# Patient Record
Sex: FEMALE | Race: WHITE | NOT HISPANIC OR LATINO | Employment: PART TIME | URBAN - METROPOLITAN AREA
[De-identification: names, ages, dates, MRNs, and addresses within clinical notes are randomized per-mention and may not be internally consistent; named-entity substitution may affect disease eponyms.]

---

## 2018-03-27 ENCOUNTER — OFFICE VISIT (OUTPATIENT)
Dept: URGENT CARE | Facility: CLINIC | Age: 23
End: 2018-03-27
Payer: COMMERCIAL

## 2018-03-27 VITALS
DIASTOLIC BLOOD PRESSURE: 73 MMHG | HEIGHT: 61 IN | HEART RATE: 109 BPM | SYSTOLIC BLOOD PRESSURE: 112 MMHG | RESPIRATION RATE: 20 BRPM | WEIGHT: 160 LBS | BODY MASS INDEX: 30.21 KG/M2 | TEMPERATURE: 99 F | OXYGEN SATURATION: 97 %

## 2018-03-27 DIAGNOSIS — J02.0 STREP PHARYNGITIS: Primary | ICD-10-CM

## 2018-03-27 PROCEDURE — 96372 THER/PROPH/DIAG INJ SC/IM: CPT | Mod: S$GLB,,, | Performed by: INTERNAL MEDICINE

## 2018-03-27 PROCEDURE — 99203 OFFICE O/P NEW LOW 30 MIN: CPT | Mod: 25,S$GLB,, | Performed by: NURSE PRACTITIONER

## 2018-03-27 RX ORDER — AMOXICILLIN 875 MG/1
875 TABLET, FILM COATED ORAL 2 TIMES DAILY
Qty: 14 TABLET | Refills: 0 | Status: SHIPPED | OUTPATIENT
Start: 2018-03-27 | End: 2018-04-06

## 2018-03-27 RX ORDER — NORGESTIMATE AND ETHINYL ESTRADIOL 7DAYSX3 28
1 KIT ORAL DAILY
COMMUNITY
End: 2023-05-18

## 2018-03-27 RX ORDER — CEFTRIAXONE 1 G/1
1 INJECTION, POWDER, FOR SOLUTION INTRAMUSCULAR; INTRAVENOUS
Status: COMPLETED | OUTPATIENT
Start: 2018-03-27 | End: 2018-03-27

## 2018-03-27 RX ORDER — PREDNISONE 5 MG/1
5 TABLET ORAL DAILY
Qty: 5 TABLET | Refills: 0 | Status: SHIPPED | OUTPATIENT
Start: 2018-03-27 | End: 2018-04-01

## 2018-03-27 RX ADMIN — CEFTRIAXONE 1 G: 1 INJECTION, POWDER, FOR SOLUTION INTRAMUSCULAR; INTRAVENOUS at 10:03

## 2018-03-27 NOTE — PROGRESS NOTES
"Subjective:       Patient ID: Cyndi Chen is a 23 y.o. female.    Vitals:  height is 5' 1" (1.549 m) and weight is 72.6 kg (160 lb). Her oral temperature is 98.7 °F (37.1 °C). Her blood pressure is 112/73 and her pulse is 109. Her respiration is 20 and oxygen saturation is 97%.     Chief Complaint: Sore Throat    Sore Throat    This is a new problem. The current episode started in the past 7 days (2 Days). The problem has been unchanged. The pain is worse on the right side. There has been no fever. The pain is at a severity of 6/10. The pain is moderate. Associated symptoms include headaches (a little foggy), swollen glands and trouble swallowing. Pertinent negatives include no abdominal pain, congestion, diarrhea, shortness of breath or vomiting. She has tried nothing for the symptoms. The treatment provided no relief.     Review of Systems   Constitution: Positive for malaise/fatigue. Negative for chills, diaphoresis, fever, weakness and night sweats.   HENT: Positive for sore throat and trouble swallowing. Negative for congestion.    Eyes: Negative for blurred vision.   Cardiovascular: Negative for chest pain.   Respiratory: Negative for shortness of breath.    Skin: Negative for rash.   Musculoskeletal: Positive for arthritis and myalgias. Negative for back pain and joint pain.   Gastrointestinal: Negative for abdominal pain, constipation, diarrhea, nausea and vomiting.   Neurological: Positive for headaches (a little foggy).   Psychiatric/Behavioral: The patient is not nervous/anxious.        Objective:      Physical Exam   Constitutional: She is oriented to person, place, and time. She appears well-developed and well-nourished.   HENT:   Head: Normocephalic and atraumatic.   Right Ear: External ear normal.   Left Ear: External ear normal.   Nose: Nose normal.   Right tonsil with +2 swelling and erythema with pus present.    Cardiovascular: Normal rate, regular rhythm and normal heart sounds.  "   Pulmonary/Chest: Effort normal and breath sounds normal.   Abdominal: Soft. Bowel sounds are normal. There is no tenderness.   Musculoskeletal: She exhibits no edema.   Lymphadenopathy:     She has cervical adenopathy.        Right cervical: Superficial cervical and deep cervical adenopathy present. No posterior cervical adenopathy present.       Left cervical: No superficial cervical, no deep cervical and no posterior cervical adenopathy present.   Neurological: She is alert and oriented to person, place, and time.   Skin: Skin is warm and dry.   Psychiatric: She has a normal mood and affect. Her behavior is normal. Judgment and thought content normal.   Nursing note and vitals reviewed.      Assessment:       1. Strep pharyngitis        Plan:         1. Strep pharyngitis  Advised on precautions with kissing and sharing drinks. Request rocephin since having trouble swallowing today. OK for rocephin 1 gm today. Hold oral a/b until tomorrow.   - amoxicillin (AMOXIL) 875 MG tablet; Take 1 tablet (875 mg total) by mouth 2 (two) times daily.  Dispense: 14 tablet; Refill: 0  - predniSONE (DELTASONE) 5 MG tablet; Take 1 tablet (5 mg total) by mouth once daily.  Dispense: 5 tablet; Refill: 0

## 2018-03-27 NOTE — PATIENT INSTRUCTIONS
Hold oral a/b (amoxil) until tomorrow.     Pharyngitis: Strep (Presumed)    You have pharyngitis (sore throat). The cause is thought to be the streptococcus, or strep, bacterium. Strep throat infection can cause throat pain that is worse when swallowing, aching all over, headache, and fever. The infection may be spread by coughing, kissing, or touching others after touching your mouth or nose. Antibiotic medications are given to treat the infection.  Home care  · Rest at home. Drink plenty of fluids to avoid dehydration.  · No work or school for the first 2 days of taking the antibiotics. After this time, you will not be contagious. You can then return to work or school if you are feeling better.   · The antibiotic medication must be taken for the full 10 days, even if you feel better. This is very important to ensure the infection is treated. It is also important to prevent drug-resistant organisms from developing. If you were given an antibiotic shot, no more antibiotics are needed.  · You may use acetaminophen or ibuprofen to control pain or fever, unless another medicine was prescribed for this. If you have chronic liver or kidney disease or ever had a stomach ulcer or GI bleeding, talk with your doctor before using these medicines.  · Throat lozenges or a throat-numbing sprays can help reduce throat pain. Gargling with warm salt water can also help. Dissolve 1/2 teaspoon of salt in 1 8 ounce glass of warm water.   · Avoid salty or spicy foods, which can irritate the throat.  Follow-up care  Follow up with your healthcare provider or our staff if you are not improving over the next week.  When to seek medical advice  Call your healthcare provider right away if any of these occur:  · Fever as directed by your doctor.   · New or worsening ear pain, sinus pain, or headache  · Painful lumps in the back of neck  · Stiff neck  · Lymph nodes are getting larger  · Inability to swallow liquids, excessive drooling, or  inability to open mouth wide due to throat pain  · Signs of dehydration (very dark urine or no urine, sunken eyes, dizziness)  · Trouble breathing or noisy breathing  · Muffled voice  · New rash  Date Last Reviewed: 4/13/2015  © 7613-3951 [employer]. [address] [place] [id number]. All rights reserved. This information is not intended as a substitute for professional medical care. Always follow your healthcare professional's instructions.

## 2018-03-30 ENCOUNTER — TELEPHONE (OUTPATIENT)
Dept: URGENT CARE | Facility: CLINIC | Age: 23
End: 2018-03-30

## 2021-03-23 ENCOUNTER — IMMUNIZATION (OUTPATIENT)
Dept: OBSTETRICS AND GYNECOLOGY | Facility: CLINIC | Age: 26
End: 2021-03-23
Payer: COMMERCIAL

## 2021-03-23 DIAGNOSIS — Z23 NEED FOR VACCINATION: Primary | ICD-10-CM

## 2021-03-23 PROCEDURE — 0001A COVID-19, MRNA, LNP-S, PF, 30 MCG/0.3 ML DOSE VACCINE: ICD-10-PCS | Mod: CV19,,, | Performed by: FAMILY MEDICINE

## 2021-03-23 PROCEDURE — 0001A COVID-19, MRNA, LNP-S, PF, 30 MCG/0.3 ML DOSE VACCINE: CPT | Mod: CV19,,, | Performed by: FAMILY MEDICINE

## 2021-03-23 PROCEDURE — 91300 COVID-19, MRNA, LNP-S, PF, 30 MCG/0.3 ML DOSE VACCINE: CPT | Mod: ,,, | Performed by: FAMILY MEDICINE

## 2021-03-23 PROCEDURE — 91300 COVID-19, MRNA, LNP-S, PF, 30 MCG/0.3 ML DOSE VACCINE: ICD-10-PCS | Mod: ,,, | Performed by: FAMILY MEDICINE

## 2021-04-13 ENCOUNTER — IMMUNIZATION (OUTPATIENT)
Dept: OBSTETRICS AND GYNECOLOGY | Facility: CLINIC | Age: 26
End: 2021-04-13
Payer: COMMERCIAL

## 2021-04-13 DIAGNOSIS — Z23 NEED FOR VACCINATION: Primary | ICD-10-CM

## 2021-04-13 PROCEDURE — 91300 COVID-19, MRNA, LNP-S, PF, 30 MCG/0.3 ML DOSE VACCINE: CPT | Mod: S$GLB,,, | Performed by: FAMILY MEDICINE

## 2021-04-13 PROCEDURE — 0002A COVID-19, MRNA, LNP-S, PF, 30 MCG/0.3 ML DOSE VACCINE: ICD-10-PCS | Mod: CV19,S$GLB,, | Performed by: FAMILY MEDICINE

## 2021-04-13 PROCEDURE — 91300 COVID-19, MRNA, LNP-S, PF, 30 MCG/0.3 ML DOSE VACCINE: ICD-10-PCS | Mod: S$GLB,,, | Performed by: FAMILY MEDICINE

## 2021-04-13 PROCEDURE — 0002A COVID-19, MRNA, LNP-S, PF, 30 MCG/0.3 ML DOSE VACCINE: CPT | Mod: CV19,S$GLB,, | Performed by: FAMILY MEDICINE

## 2022-09-27 ENCOUNTER — OFFICE VISIT (OUTPATIENT)
Dept: URGENT CARE | Facility: CLINIC | Age: 27
End: 2022-09-27
Payer: COMMERCIAL

## 2022-09-27 VITALS
OXYGEN SATURATION: 96 % | WEIGHT: 160 LBS | DIASTOLIC BLOOD PRESSURE: 76 MMHG | RESPIRATION RATE: 16 BRPM | HEIGHT: 61 IN | HEART RATE: 88 BPM | TEMPERATURE: 99 F | BODY MASS INDEX: 30.21 KG/M2 | SYSTOLIC BLOOD PRESSURE: 107 MMHG

## 2022-09-27 DIAGNOSIS — R05.9 COUGH: ICD-10-CM

## 2022-09-27 DIAGNOSIS — J10.1 INFLUENZA A: Primary | ICD-10-CM

## 2022-09-27 DIAGNOSIS — R09.81 NASAL CONGESTION: ICD-10-CM

## 2022-09-27 DIAGNOSIS — R50.9 FEVER, UNSPECIFIED FEVER CAUSE: ICD-10-CM

## 2022-09-27 LAB
CTP QC/QA: YES
CTP QC/QA: YES
POC MOLECULAR INFLUENZA A AGN: POSITIVE
POC MOLECULAR INFLUENZA B AGN: NEGATIVE
SARS-COV-2 RDRP RESP QL NAA+PROBE: NEGATIVE

## 2022-09-27 PROCEDURE — 99204 PR OFFICE/OUTPT VISIT, NEW, LEVL IV, 45-59 MIN: ICD-10-PCS | Mod: S$GLB,,,

## 2022-09-27 PROCEDURE — 3074F SYST BP LT 130 MM HG: CPT | Mod: CPTII,S$GLB,,

## 2022-09-27 PROCEDURE — 3078F PR MOST RECENT DIASTOLIC BLOOD PRESSURE < 80 MM HG: ICD-10-PCS | Mod: CPTII,S$GLB,,

## 2022-09-27 PROCEDURE — 3074F PR MOST RECENT SYSTOLIC BLOOD PRESSURE < 130 MM HG: ICD-10-PCS | Mod: CPTII,S$GLB,,

## 2022-09-27 PROCEDURE — 87502 INFLUENZA DNA AMP PROBE: CPT | Mod: QW,S$GLB,,

## 2022-09-27 PROCEDURE — 1160F RVW MEDS BY RX/DR IN RCRD: CPT | Mod: CPTII,S$GLB,,

## 2022-09-27 PROCEDURE — 3078F DIAST BP <80 MM HG: CPT | Mod: CPTII,S$GLB,,

## 2022-09-27 PROCEDURE — 1159F MED LIST DOCD IN RCRD: CPT | Mod: CPTII,S$GLB,,

## 2022-09-27 PROCEDURE — 87502 POCT INFLUENZA A/B MOLECULAR: ICD-10-PCS | Mod: QW,S$GLB,,

## 2022-09-27 PROCEDURE — U0002: ICD-10-PCS | Mod: QW,S$GLB,,

## 2022-09-27 PROCEDURE — 1159F PR MEDICATION LIST DOCUMENTED IN MEDICAL RECORD: ICD-10-PCS | Mod: CPTII,S$GLB,,

## 2022-09-27 PROCEDURE — 99204 OFFICE O/P NEW MOD 45 MIN: CPT | Mod: S$GLB,,,

## 2022-09-27 PROCEDURE — 3008F BODY MASS INDEX DOCD: CPT | Mod: CPTII,S$GLB,,

## 2022-09-27 PROCEDURE — U0002 COVID-19 LAB TEST NON-CDC: HCPCS | Mod: QW,S$GLB,,

## 2022-09-27 PROCEDURE — 3008F PR BODY MASS INDEX (BMI) DOCUMENTED: ICD-10-PCS | Mod: CPTII,S$GLB,,

## 2022-09-27 PROCEDURE — 1160F PR REVIEW ALL MEDS BY PRESCRIBER/CLIN PHARMACIST DOCUMENTED: ICD-10-PCS | Mod: CPTII,S$GLB,,

## 2022-09-27 RX ORDER — AZELASTINE 1 MG/ML
1 SPRAY, METERED NASAL 2 TIMES DAILY
Qty: 30 ML | Refills: 0 | Status: SHIPPED | OUTPATIENT
Start: 2022-09-27 | End: 2023-05-18

## 2022-09-27 RX ORDER — OSELTAMIVIR PHOSPHATE 75 MG/1
75 CAPSULE ORAL 2 TIMES DAILY
Qty: 10 CAPSULE | Refills: 0 | Status: SHIPPED | OUTPATIENT
Start: 2022-09-27 | End: 2022-10-02

## 2022-09-27 RX ORDER — LEVOCETIRIZINE DIHYDROCHLORIDE 5 MG/1
5 TABLET, FILM COATED ORAL NIGHTLY
COMMUNITY

## 2022-09-27 NOTE — PROGRESS NOTES
"Subjective:       Patient ID: Cyndi Chen is a 27 y.o. female.    Vitals:  height is 5' 1" (1.549 m) and weight is 72.6 kg (160 lb). Her oral temperature is 98.5 °F (36.9 °C). Her blood pressure is 107/76 and her pulse is 88. Her respiration is 16 and oxygen saturation is 96%.     Chief Complaint: Sinus Problem    Patient presents with sinus problems and a dry cough that began yesterday. Patient is triple vaccinated and has not had Covid recently. Patient also states she had a fever of 102 F at 4 AM this morning and took a dose of Advil at 7:00 AM.    Provider note starts below:  Patient senses sinus pain, pressure, postnasal drip, nasal congestion, sore throat and cough and sneezing since last night.  She states she had a fever up to 102 this morning and took Advil.  She has positive exposure to the flu.  Denies any shortness of breath, wheezing, nausea, vomiting, diarrhea.    Sinus Problem  This is a new problem. The current episode started yesterday. The problem has been gradually worsening since onset. The maximum temperature recorded prior to her arrival was 102 - 102.9 F. The fever has been present for 1 to 2 days. Her pain is at a severity of 0/10. She is experiencing no pain. Associated symptoms include chills, congestion, coughing, sinus pressure, sneezing and a sore throat. Pertinent negatives include no ear pain or shortness of breath. Treatments tried: advil. The treatment provided mild relief.     Constitution: Positive for chills and fever.   HENT:  Positive for congestion, postnasal drip, sinus pain, sinus pressure and sore throat. Negative for ear pain.    Respiratory:  Positive for cough. Negative for sputum production, shortness of breath and wheezing.    Gastrointestinal:  Negative for nausea, vomiting and diarrhea.   Musculoskeletal:  Positive for muscle ache.   Allergic/Immunologic: Positive for sneezing.     Objective:      Physical Exam   Constitutional: She is oriented to person, place, " and time. She appears well-developed. She is cooperative.  Non-toxic appearance. She does not appear ill. No distress.   HENT:   Head: Normocephalic and atraumatic.   Ears:   Right Ear: Hearing, tympanic membrane, external ear and ear canal normal.   Left Ear: Hearing, tympanic membrane, external ear and ear canal normal.   Nose: Rhinorrhea present. No mucosal edema or nasal deformity. No epistaxis. Right sinus exhibits no maxillary sinus tenderness and no frontal sinus tenderness. Left sinus exhibits no maxillary sinus tenderness and no frontal sinus tenderness.   Mouth/Throat: Uvula is midline and mucous membranes are normal. Mucous membranes are moist. No trismus in the jaw. Normal dentition. No uvula swelling. Posterior oropharyngeal erythema (mild) present. No oropharyngeal exudate or posterior oropharyngeal edema. Tonsils are 1+ on the right. Tonsils are 1+ on the left. No tonsillar exudate.   Eyes: Conjunctivae and lids are normal. No scleral icterus. Extraocular movement intact   Neck: Trachea normal and phonation normal. Neck supple. No edema present. No erythema present. No neck rigidity present.   Cardiovascular: Normal rate, regular rhythm, normal heart sounds and normal pulses.   Pulmonary/Chest: Effort normal and breath sounds normal. No respiratory distress. She has no decreased breath sounds. She has no wheezes. She has no rhonchi.   Abdominal: Normal appearance.   Musculoskeletal: Normal range of motion.         General: No deformity. Normal range of motion.   Lymphadenopathy:     She has no cervical adenopathy.   Neurological: She is alert and oriented to person, place, and time. She exhibits normal muscle tone. Coordination normal.   Skin: Skin is warm, dry, intact, not diaphoretic and not pale.   Psychiatric: Her speech is normal and behavior is normal. Judgment and thought content normal.   Nursing note and vitals reviewed.      Results for orders placed or performed in visit on 09/27/22   POCT  COVID-19 Rapid Screening   Result Value Ref Range    POC Rapid COVID Negative Negative     Acceptable Yes    POCT Influenza A/B MOLECULAR   Result Value Ref Range    POC Molecular Influenza A Ag Positive (A) Negative, Not Reported    POC Molecular Influenza B Ag Negative Negative, Not Reported     Acceptable Yes        Assessment:       1. Influenza A    2. Cough    3. Fever, unspecified fever cause    4. Nasal congestion          Plan:     Labs reviewed.     Influenza A  -     oseltamivir (TAMIFLU) 75 MG capsule; Take 1 capsule (75 mg total) by mouth 2 (two) times daily. for 5 days  Dispense: 10 capsule; Refill: 0    Cough  -     POCT COVID-19 Rapid Screening    Fever, unspecified fever cause  -     POCT Influenza A/B MOLECULAR    Nasal congestion  -     azelastine (ASTELIN) 137 mcg (0.1 %) nasal spray; 1 spray (137 mcg total) by Nasal route 2 (two) times daily.  Dispense: 30 mL; Refill: 0       Patient Instructions   PLEASE READ ALL DISCHARGE INSTRUCTIONS    - Rest.    - Drink plenty of fluids.  - Viral upper respiratory infections typically run their course in 10-14 days.   - use nasal spray for nasal congestion and to also dry up post nasal drip     - You can take over-the-counter claritin, zyrtec, allegra, or xyzal as directed. These are antihistamines that can help with runny nose, nasal congestion, sneezing, and helps to dry up post-nasal drip, which usually causes sore throat and cough.               - If you do NOT have high blood pressure, you may use a decongestant form (D)  of this medication (ie. Claritin- D, zyrtec-D, allegra-D) or if you do not take the D form, you can take sudafed (pseudoephedrine) over the counter, which is a decongestant. Do NOT take two decongestant (D) medications at the same time (such as mucinex-D and claritin-D or plain sudafed and claritin D). Dextromethorphan (DM) is a cough suppressant over the counter (ie. mucinex DM, robitussin, delsym;  dayquil/nyquil has DM as well.)    -If you DO have high blood pressure, you may take Coricidin HBP for sinus congestion and Delsym for cough.      - You can use Flonase (fluticasone) nasal spray as directed for sinus congestion and postnasal drip. This is a steroid nasal spray that works locally over time to decrease the inflammation in your nose/sinuses and help with allergic symptoms. This is not an quick- relief spray like afrin, but it works well if used daily.  Discontinue if you develop nose bleed  - Use nasal saline prior to Flonase.  - Use Ocean Spray Nasal Saline 1-3 puffs each nostril every 2-3 hours then blow out onto tissue. This is to irrigate the nasal passage way to clear the sinus openings. Use until sinus problem resolved.     - You can take plain Mucinex (guaifenesin) 1200 mg twice a day to help loosen mucous.      - Warm salt water gargles can help with sore throat     - Warm tea with honey can help with cough. Honey is a natural cough suppressant.    - Acetaminophen (tylenol) or Ibuprofen (advil,motrin) as directed as needed for fever/pain. Avoid tylenol if you have a history of liver disease. Do not take ibuprofen if you have a history of GI bleeding, kidney disease, or if you take blood thinners.     - You must understand that you have received an Urgent Care treatment only and that you may be released before all of your medical problems are known or treated.   - You, the patient, will arrange for follow up care as instructed.   - If your condition worsens or fails to improve we recommend that you receive another evaluation at the ER immediately or contact your PCP to discuss your concerns or return here.   - Follow up with your PCP or specialty clinic as directed in the next 1-2 weeks if not improved or as needed.  You can call [phone] to schedule an appointment with the appropriate provider.

## 2022-09-27 NOTE — PATIENT INSTRUCTIONS
PLEASE READ ALL DISCHARGE INSTRUCTIONS    - Rest.    - Drink plenty of fluids.  - Viral upper respiratory infections typically run their course in 10-14 days.   - use nasal spray for nasal congestion and to also dry up post nasal drip     - You can take over-the-counter claritin, zyrtec, allegra, or xyzal as directed. These are antihistamines that can help with runny nose, nasal congestion, sneezing, and helps to dry up post-nasal drip, which usually causes sore throat and cough.               - If you do NOT have high blood pressure, you may use a decongestant form (D)  of this medication (ie. Claritin- D, zyrtec-D, allegra-D) or if you do not take the D form, you can take sudafed (pseudoephedrine) over the counter, which is a decongestant. Do NOT take two decongestant (D) medications at the same time (such as mucinex-D and claritin-D or plain sudafed and claritin D). Dextromethorphan (DM) is a cough suppressant over the counter (ie. mucinex DM, robitussin, delsym; dayquil/nyquil has DM as well.)    -If you DO have high blood pressure, you may take Coricidin HBP for sinus congestion and Delsym for cough.      - You can use Flonase (fluticasone) nasal spray as directed for sinus congestion and postnasal drip. This is a steroid nasal spray that works locally over time to decrease the inflammation in your nose/sinuses and help with allergic symptoms. This is not an quick- relief spray like afrin, but it works well if used daily.  Discontinue if you develop nose bleed  - Use nasal saline prior to Flonase.  - Use Ocean Spray Nasal Saline 1-3 puffs each nostril every 2-3 hours then blow out onto tissue. This is to irrigate the nasal passage way to clear the sinus openings. Use until sinus problem resolved.     - You can take plain Mucinex (guaifenesin) 1200 mg twice a day to help loosen mucous.      - Warm salt water gargles can help with sore throat     - Warm tea with honey can help with cough. Honey is a natural cough  suppressant.    - Acetaminophen (tylenol) or Ibuprofen (advil,motrin) as directed as needed for fever/pain. Avoid tylenol if you have a history of liver disease. Do not take ibuprofen if you have a history of GI bleeding, kidney disease, or if you take blood thinners.     - You must understand that you have received an Urgent Care treatment only and that you may be released before all of your medical problems are known or treated.   - You, the patient, will arrange for follow up care as instructed.   - If your condition worsens or fails to improve we recommend that you receive another evaluation at the ER immediately or contact your PCP to discuss your concerns or return here.   - Follow up with your PCP or specialty clinic as directed in the next 1-2 weeks if not improved or as needed.  You can call [phone] to schedule an appointment with the appropriate provider.

## 2022-09-27 NOTE — LETTER
September 27, 2022      Urgent [facility] [address] [address]  Phone: [phone]  Fax: [phone]       Patient: Cyndi Chen   YOB: 1995  Date of Visit: 09/27/2022    To Whom It May Concern:    Duyen Chen  was at [facility] on 09/27/2022. The patient may return to work/school once afebrile for 24 hours without the use of fever reducing medication.  At the earliest 09/29/2022. If you have any questions or concerns, or if I can be of further assistance, please do not hesitate to contact me.    Sincerely,      Candice Hall PA-C

## 2023-05-18 ENCOUNTER — TELEPHONE (OUTPATIENT)
Dept: PRIMARY CARE CLINIC | Facility: CLINIC | Age: 28
End: 2023-05-18
Payer: COMMERCIAL

## 2023-05-18 ENCOUNTER — OFFICE VISIT (OUTPATIENT)
Dept: PRIMARY CARE CLINIC | Facility: CLINIC | Age: 28
End: 2023-05-18
Payer: COMMERCIAL

## 2023-05-18 VITALS
TEMPERATURE: 98 F | DIASTOLIC BLOOD PRESSURE: 71 MMHG | HEIGHT: 61 IN | BODY MASS INDEX: 36.84 KG/M2 | RESPIRATION RATE: 18 BRPM | WEIGHT: 195.13 LBS | OXYGEN SATURATION: 99 % | HEART RATE: 85 BPM | SYSTOLIC BLOOD PRESSURE: 120 MMHG

## 2023-05-18 DIAGNOSIS — F41.1 GAD (GENERALIZED ANXIETY DISORDER): ICD-10-CM

## 2023-05-18 DIAGNOSIS — K21.9 GASTROESOPHAGEAL REFLUX DISEASE, UNSPECIFIED WHETHER ESOPHAGITIS PRESENT: ICD-10-CM

## 2023-05-18 DIAGNOSIS — Z00.00 PREVENTATIVE HEALTH CARE: ICD-10-CM

## 2023-05-18 DIAGNOSIS — Z91.89 SEDENTARY LIFESTYLE: ICD-10-CM

## 2023-05-18 DIAGNOSIS — F51.02 ADJUSTMENT INSOMNIA: ICD-10-CM

## 2023-05-18 DIAGNOSIS — Z00.00 PREVENTATIVE HEALTH CARE: Primary | ICD-10-CM

## 2023-05-18 DIAGNOSIS — J30.1 SEASONAL ALLERGIC RHINITIS DUE TO POLLEN: Primary | ICD-10-CM

## 2023-05-18 DIAGNOSIS — G47.00 INSOMNIA, UNSPECIFIED TYPE: ICD-10-CM

## 2023-05-18 DIAGNOSIS — A04.8 H. PYLORI INFECTION: ICD-10-CM

## 2023-05-18 DIAGNOSIS — E66.9 OBESITY (BMI 35.0-39.9 WITHOUT COMORBIDITY): ICD-10-CM

## 2023-05-18 PROCEDURE — 3008F PR BODY MASS INDEX (BMI) DOCUMENTED: ICD-10-PCS | Mod: CPTII,S$GLB,, | Performed by: INTERNAL MEDICINE

## 2023-05-18 PROCEDURE — 1159F PR MEDICATION LIST DOCUMENTED IN MEDICAL RECORD: ICD-10-PCS | Mod: CPTII,S$GLB,, | Performed by: INTERNAL MEDICINE

## 2023-05-18 PROCEDURE — 99215 OFFICE O/P EST HI 40 MIN: CPT | Mod: S$GLB,,, | Performed by: INTERNAL MEDICINE

## 2023-05-18 PROCEDURE — 3008F BODY MASS INDEX DOCD: CPT | Mod: CPTII,S$GLB,, | Performed by: INTERNAL MEDICINE

## 2023-05-18 PROCEDURE — 99215 PR OFFICE/OUTPT VISIT, EST, LEVL V, 40-54 MIN: ICD-10-PCS | Mod: S$GLB,,, | Performed by: INTERNAL MEDICINE

## 2023-05-18 PROCEDURE — 3074F PR MOST RECENT SYSTOLIC BLOOD PRESSURE < 130 MM HG: ICD-10-PCS | Mod: CPTII,S$GLB,, | Performed by: INTERNAL MEDICINE

## 2023-05-18 PROCEDURE — 99999 PR PBB SHADOW E&M-EST. PATIENT-LVL IV: ICD-10-PCS | Mod: PBBFAC,,, | Performed by: INTERNAL MEDICINE

## 2023-05-18 PROCEDURE — 1159F MED LIST DOCD IN RCRD: CPT | Mod: CPTII,S$GLB,, | Performed by: INTERNAL MEDICINE

## 2023-05-18 PROCEDURE — 3078F DIAST BP <80 MM HG: CPT | Mod: CPTII,S$GLB,, | Performed by: INTERNAL MEDICINE

## 2023-05-18 PROCEDURE — 3074F SYST BP LT 130 MM HG: CPT | Mod: CPTII,S$GLB,, | Performed by: INTERNAL MEDICINE

## 2023-05-18 PROCEDURE — 3078F PR MOST RECENT DIASTOLIC BLOOD PRESSURE < 80 MM HG: ICD-10-PCS | Mod: CPTII,S$GLB,, | Performed by: INTERNAL MEDICINE

## 2023-05-18 PROCEDURE — 99999 PR PBB SHADOW E&M-EST. PATIENT-LVL IV: CPT | Mod: PBBFAC,,, | Performed by: INTERNAL MEDICINE

## 2023-05-18 RX ORDER — BUSPIRONE HYDROCHLORIDE 10 MG/1
10 TABLET ORAL 3 TIMES DAILY
Qty: 90 TABLET | Refills: 11 | Status: SHIPPED | OUTPATIENT
Start: 2023-05-18 | End: 2024-05-17

## 2023-05-18 RX ORDER — SERTRALINE HYDROCHLORIDE 50 MG/1
TABLET, FILM COATED ORAL
Qty: 30 TABLET | Refills: 11 | Status: SHIPPED | OUTPATIENT
Start: 2023-05-18 | End: 2023-06-20 | Stop reason: SDUPTHER

## 2023-05-18 RX ORDER — FLUTICASONE PROPIONATE 50 MCG
1 SPRAY, SUSPENSION (ML) NASAL 2 TIMES DAILY
Qty: 16 G | Refills: 3 | Status: SHIPPED | OUTPATIENT
Start: 2023-05-18

## 2023-05-18 RX ORDER — OMEPRAZOLE 20 MG/1
20 CAPSULE, DELAYED RELEASE ORAL DAILY
Qty: 30 CAPSULE | Refills: 11 | Status: SHIPPED | OUTPATIENT
Start: 2023-05-18 | End: 2023-05-18

## 2023-05-18 RX ORDER — HYDROXYZINE PAMOATE 25 MG/1
25 CAPSULE ORAL 4 TIMES DAILY
Qty: 30 CAPSULE | Refills: 5 | Status: SHIPPED | OUTPATIENT
Start: 2023-05-18

## 2023-05-18 RX ORDER — OMEPRAZOLE 20 MG/1
20 CAPSULE, DELAYED RELEASE ORAL DAILY
Qty: 30 CAPSULE | Refills: 11 | Status: SHIPPED | OUTPATIENT
Start: 2023-05-18 | End: 2024-05-17

## 2023-05-18 NOTE — ASSESSMENT & PLAN NOTE
COVID booster  Make dental appt   Start Prilosec 20mg daily or Zantac 75mg /Pepcid 20mg over the counter 30 min prior to breakfast x 4-6 weeks.  Initiate GERD precautions ie lose weight, avoid eating 3 hours prior to meals, minimize caffeine, alcohol, tobacco, spicy, greasy, fatty foods; avoid peppermint chocolates, citrus and other acidic foods. Increase exercise to help with digestion and avoid lying down immediately after eating.   If the above are not effective, will obtain CXR and/or refer to ENT for further evaluation.      Schedule with Dr. Andrew for pap smear    Get [facility] ([facility])    Labs Today    Melatonin 5mg at bedtime/ Vistaril 25mg 30 min before bedtime

## 2023-05-18 NOTE — PATIENT INSTRUCTIONS
Start Prilosec 20mg daily or Zantac 75mg /Pepcid 20mg over the counter 30 min prior to breakfast x 4-6 weeks.  Initiate GERD precautions ie lose weight, avoid eating 3 hours prior to meals, minimize caffeine, alcohol, tobacco, spicy, greasy, fatty foods; avoid peppermint chocolates, citrus and other acidic foods. Increase exercise to help with digestion and avoid lying down immediately after eating.   If the above are not effective, will obtain CXR and/or refer to ENT for further evaluation.      Schedule with Dr. Andrew for pap smear    [facility] ([facility])    Labs Today    Melatonin 5mg at bedtime/ Vistaril 25mg 30 min before bedtime    Zoloft 50mg 1/2 tab po daily x 7 days then full tablet daily     Buspar 10mg twice day

## 2023-05-18 NOTE — PROGRESS NOTES
[facility] Progress Note      Chief Complaint     Annual Exam, Establish Care, and Anxiety       Subjective:      History of Present Illness:  Cyndi Chen is a 28 y.o. female last saw Dr. Silva in 2018 as her PCP moved to [facility] in 9/2019     AUNG: used THC to cope with stress associated with work but has weaned off of the THC and no longer smokes THC for a few months; has difficulty focusing at work but is stoic and is able to compensate well so only her close workers know that she is so anxious   -today AUNG-7: 15 today    Insomnia: sleeps 6-8 hrs at night but feels tired since she didn't get enough sleep; has difficult time falling asleep; never tried Melatonin and is on her cell phone frequently  Works 12-13 hours per day x 5 days  -did very well in school     Seasonal AR: needs refill of flonase; still taking Xyzal 5mg daily     GERD: had acute epigastric pain associated with diarrhea on 4/26/2023 comparable to when she had H. Pylori in 2019 and went to urgent care who did H. Pylori IgG and IgM which were negative.   -pt was dx'ed with GERD and placed on prilosec with improvement; has hx H. 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   FH: MGM pancreatic cancer; mom breast cancer dx'ed at 56 y/o; 60 y/o; older sister dx'ed with ADD and takes Vyvanse  20mg and Adderall;        Past Medical History:  Past Medical History:   Diagnosis Date    Allergy        Past Surgical History:  Past Surgical History:   Procedure Laterality Date    MOUTH SURGERY Bilateral        Allergies:  Review of patient's allergies indicates:   Allergen  "Reactions    Zithromax [azithromycin] Rash       Home Medications:  Current Outpatient Medications   Medication Sig Dispense Refill    levocetirizine (XYZAL) 5 MG tablet Take 5 mg by mouth every evening.      fluticasone propionate (FLONASE) 50 mcg/actuation nasal spray 1 spray (50 mcg total) by Each Nostril route 2 (two) times a day. 16 g 3    hydrOXYzine pamoate (VISTARIL) 25 MG Cap Take 1 capsule (25 mg total) by mouth 4 (four) times daily. 30 capsule 5    omeprazole (PRILOSEC) 20 MG capsule Take 1 capsule (20 mg total) by mouth once daily. 30 capsule 11     No current facility-administered medications for this visit.        Family History:  Family History   Problem Relation Age of Onset    Cancer Mother     No Known Problems Father        Social History:  Social History     Tobacco Use    Smoking status: Never    Smokeless tobacco: Never   Substance Use Topics    Alcohol use: Yes    Drug use: No       Review of Systems:  Pertinent positives and negatives listed in HPI. All other systems are reviewed and are negative.    Health Maintaince :   Health Maintenance Topics with due status: Not Due       Topic Last Completion Date    Influenza Vaccine Not Due           Eye: UTD  Dental: need     Immunizations:   Tdap: n/a.  Influenza: n/a but got flu  COVID: needs booster   Hepatitis C:   Cancer Screening:  PAP: UTD 9/2018    The ASCVD Risk score (Simba WALKER, et al., 2019) failed to calculate for the following reasons:    The 2019 ASCVD risk score is only valid for ages 40 to 79      Objective:   /71 (BP Location: Right arm, Patient Position: Sitting, BP Method: Medium (Manual))   Pulse 85   Temp 97.8 °F (36.6 °C)   Resp 18   Ht 5' 1" (1.549 m)   Wt 88.5 kg (195 lb 1.7 oz)   LMP 04/27/2023   SpO2 99%   BMI 36.87 kg/m²      Body mass index is 36.87 kg/m².       Physical Examination:  General: Alert and awake in no apparent distress  HEENT: Normocephalic and atraumatic; Tms WNL  Eyes:  PERRL; EOMi with " anicteric sclera and clear conjunctivae  Mouth:  Oropharynx clear and without exudate; moist mucous membranes  Neck:   Cervical nodes not enlarged; supple; no bruits  Cardio:  Regular rate and rhythm with normal S1 and S2; no murmurs or rubs  Resp:  CTAB; respirations unlabored; no wheezes, crackles or rhonchi  Abdom: Soft, NTND with normoactive bowel sounds; negative HSM  Extrem: Warm and well-perfused with no clubbing, cyanosis or edema  Skin:  No rashes, lesions, or color changes  Pulses:  2+ and symmetric distally  Neuro:  AAOx3; cooperative and pleasant with no focal deficits    Laboratory:      Most Recent Data:  No results found for: WBC, HGB, HCT, PLT, CHOL, TRIG, HDL, LDLDIRECT, ALT, AST, NA, K, CL, BUN, CO2, TSH, PSA, INR, GLUF, HGBA1C, MICROALBUR           CBC: No results found for: WBC, HGB, HCT, PLT, MCV, RDW  BMP: No results found for: NA, K, CL, CO2, BUN, CREATININE, GLU, CALCIUM, MG, PHOS  LFTs: No results found for: PROT, ALBUMIN, BILITOT, AST, ALKPHOS, ALT, GGT  Coags: No results found for: INR, PROTIME, PTT  FLP:    No results found for: CHOL  No results found for: HDL  No results found for: LDLCALC  No results found for: TRIG  No results found for: CHOLHDL   DM:    No results found for: HGBA1C, GLUF, MICROALBUR, LDLCALC, CREATININE  Thyroid: No results found for: TSH, FREET4, K2MPUTZ, Q5FWXBG, THYROIDAB  Anemia: No results found for: IRON, TIBC, FERRITIN, FRITZVIL40, FOLATE  Cardiac: No results found for: TROPONINI, CKTOTAL, CKMB, BNP  Urinalysis: No results found for: LABURIN, COLORU, PHUA, CLARITYU, SPECGRAV, LABSPEC, NITRITE, PROTEINUR, GLUCOSEU, KETONESU, UROBILINOGEN, BILIRUBINUR, BLOODU, RBCU, WBCUA    Other Results:  EKG (my interpretation):     Radiology:  No image results found.          Assessment/Plan     Cyndi Chen is a 28 y.o. female with:  1. Seasonal allergic rhinitis due to pollen  -     fluticasone propionate (FLONASE) 50 mcg/actuation nasal spray; 1 spray (50 mcg total)  by Each Nostril route 2 (two) times a day.  Dispense: 16 g; Refill: 3    2. Preventative health care  Assessment & Plan:  COVID booster  Make dental appt   Start Prilosec 20mg daily or Zantac 75mg /Pepcid 20mg over the counter 30 min prior to breakfast x 4-6 weeks.  Initiate GERD precautions ie lose weight, avoid eating 3 hours prior to meals, minimize caffeine, alcohol, tobacco, spicy, greasy, fatty foods; avoid peppermint chocolates, citrus and other acidic foods. Increase exercise to help with digestion and avoid lying down immediately after eating.   If the above are not effective, will obtain CXR and/or refer to ENT for further evaluation.      Schedule with Dr. Andrew for pap smear    Get Dental ([facility])    Labs Today    Melatonin 5mg at bedtime/ Vistaril 25mg 30 min before bedtime      Orders:  -     Lipid Panel; Future; Expected date: 05/18/2023  -     Hemoglobin A1C; Future; Expected date: 05/18/2023  -     Urinalysis; Future; Expected date: 05/18/2023  -     Comprehensive Metabolic Panel; Future; Expected date: 05/18/2023  -     CBC Auto Differential; Future; Expected date: 05/18/2023  -     Hepatitis C Antibody; Future; Expected date: 05/18/2023  -     HIV 1/2 Ag/Ab (4th Gen); Future; Expected date: 05/18/2023    3. Gastroesophageal reflux disease, unspecified whether esophagitis present  Assessment & Plan:  GERD precautions  Take Prilosec 20mg 30 min prior breakfast  Check stool H. Pylori for cure    Orders:  -     Discontinue: omeprazole (PRILOSEC) 20 MG capsule; Take 1 capsule (20 mg total) by mouth once daily.  Dispense: 30 capsule; Refill: 11  -     omeprazole (PRILOSEC) 20 MG capsule; Take 1 capsule (20 mg total) by mouth once daily.  Dispense: 30 capsule; Refill: 11    4. H. pylori infection  -     H. pylori antigen, stool; Future; Expected date: 05/18/2023    5. Insomnia, unspecified type  Assessment & Plan:  Take melatonin 5mg and take Vistaril 25mg qhs 30 min.prior to bedtime.     Orders:  -      hydrOXYzine pamoate (VISTARIL) 25 MG Cap; Take 1 capsule (25 mg total) by mouth 4 (four) times daily.  Dispense: 30 capsule; Refill: 5    6. Adjustment insomnia  Assessment & Plan:  Take melatonin 5mg and take Vistaril 25mg qhs 30 min.prior to bedtime.                I spent a total of 60 minutes on the day of the visit.This includes face to face time and non-face to face time preparing to see the patient (eg, review of tests), obtaining and/or reviewing separately obtained history, documenting clinical information in the electronic or other health record, independently interpreting results and communicating results to the patient/family/caregiver, or care coordinator.   Code Status:     Yaneth Andrew MD

## 2023-05-19 ENCOUNTER — LAB VISIT (OUTPATIENT)
Dept: LAB | Facility: HOSPITAL | Age: 28
End: 2023-05-19
Attending: INTERNAL MEDICINE
Payer: COMMERCIAL

## 2023-05-19 ENCOUNTER — TELEPHONE (OUTPATIENT)
Dept: PRIMARY CARE CLINIC | Facility: CLINIC | Age: 28
End: 2023-05-19
Payer: COMMERCIAL

## 2023-05-19 DIAGNOSIS — Z00.00 PREVENTATIVE HEALTH CARE: ICD-10-CM

## 2023-05-19 LAB
ALBUMIN SERPL BCP-MCNC: 3.7 G/DL (ref 3.5–5.2)
ALP SERPL-CCNC: 94 U/L (ref 55–135)
ALT SERPL W/O P-5'-P-CCNC: 22 U/L (ref 10–44)
ANION GAP SERPL CALC-SCNC: 6 MMOL/L (ref 8–16)
AST SERPL-CCNC: 24 U/L (ref 10–40)
BASOPHILS # BLD AUTO: 0.1 K/UL (ref 0–0.2)
BASOPHILS NFR BLD: 1.2 % (ref 0–1.9)
BILIRUB SERPL-MCNC: 0.9 MG/DL (ref 0.1–1)
BUN SERPL-MCNC: 11 MG/DL (ref 6–20)
CALCIUM SERPL-MCNC: 9.2 MG/DL (ref 8.7–10.5)
CHLORIDE SERPL-SCNC: 107 MMOL/L (ref 95–110)
CHOLEST SERPL-MCNC: 142 MG/DL (ref 120–199)
CHOLEST/HDLC SERPL: 3 {RATIO} (ref 2–5)
CO2 SERPL-SCNC: 26 MMOL/L (ref 23–29)
CREAT SERPL-MCNC: 0.8 MG/DL (ref 0.5–1.4)
DIFFERENTIAL METHOD: ABNORMAL
EOSINOPHIL # BLD AUTO: 1.8 K/UL (ref 0–0.5)
EOSINOPHIL NFR BLD: 20.8 % (ref 0–8)
ERYTHROCYTE [DISTWIDTH] IN BLOOD BY AUTOMATED COUNT: 12.1 % (ref 11.5–14.5)
EST. GFR  (NO RACE VARIABLE): >60 ML/MIN/1.73 M^2
ESTIMATED AVG GLUCOSE: 91 MG/DL (ref 68–131)
GLUCOSE SERPL-MCNC: 85 MG/DL (ref 70–110)
HBA1C MFR BLD: 4.8 % (ref 4–5.6)
HCT VFR BLD AUTO: 40.8 % (ref 37–48.5)
HCV AB SERPL QL IA: NORMAL
HDLC SERPL-MCNC: 47 MG/DL (ref 40–75)
HDLC SERPL: 33.1 % (ref 20–50)
HGB BLD-MCNC: 13.2 G/DL (ref 12–16)
HIV 1+2 AB+HIV1 P24 AG SERPL QL IA: NORMAL
IMM GRANULOCYTES # BLD AUTO: 0.02 K/UL (ref 0–0.04)
IMM GRANULOCYTES NFR BLD AUTO: 0.2 % (ref 0–0.5)
LDLC SERPL CALC-MCNC: 83.8 MG/DL (ref 63–159)
LYMPHOCYTES # BLD AUTO: 1.9 K/UL (ref 1–4.8)
LYMPHOCYTES NFR BLD: 22.5 % (ref 18–48)
MCH RBC QN AUTO: 30 PG (ref 27–31)
MCHC RBC AUTO-ENTMCNC: 32.4 G/DL (ref 32–36)
MCV RBC AUTO: 93 FL (ref 82–98)
MONOCYTES # BLD AUTO: 0.5 K/UL (ref 0.3–1)
MONOCYTES NFR BLD: 6.4 % (ref 4–15)
NEUTROPHILS # BLD AUTO: 4.2 K/UL (ref 1.8–7.7)
NEUTROPHILS NFR BLD: 48.9 % (ref 38–73)
NONHDLC SERPL-MCNC: 95 MG/DL
NRBC BLD-RTO: 0 /100 WBC
PLATELET # BLD AUTO: 298 K/UL (ref 150–450)
PMV BLD AUTO: 10.8 FL (ref 9.2–12.9)
POTASSIUM SERPL-SCNC: 4.2 MMOL/L (ref 3.5–5.1)
PROT SERPL-MCNC: 7.1 G/DL (ref 6–8.4)
RBC # BLD AUTO: 4.4 M/UL (ref 4–5.4)
SODIUM SERPL-SCNC: 139 MMOL/L (ref 136–145)
TRIGL SERPL-MCNC: 56 MG/DL (ref 30–150)
WBC # BLD AUTO: 8.5 K/UL (ref 3.9–12.7)

## 2023-05-19 PROCEDURE — 86803 HEPATITIS C AB TEST: CPT | Performed by: INTERNAL MEDICINE

## 2023-05-19 PROCEDURE — 80061 LIPID PANEL: CPT | Performed by: INTERNAL MEDICINE

## 2023-05-19 PROCEDURE — 85025 COMPLETE CBC W/AUTO DIFF WBC: CPT | Performed by: INTERNAL MEDICINE

## 2023-05-19 PROCEDURE — 80053 COMPREHEN METABOLIC PANEL: CPT | Performed by: INTERNAL MEDICINE

## 2023-05-19 PROCEDURE — 36415 COLL VENOUS BLD VENIPUNCTURE: CPT | Mod: PN | Performed by: INTERNAL MEDICINE

## 2023-05-19 PROCEDURE — 87389 HIV-1 AG W/HIV-1&-2 AB AG IA: CPT | Performed by: INTERNAL MEDICINE

## 2023-05-19 PROCEDURE — 83036 HEMOGLOBIN GLYCOSYLATED A1C: CPT | Performed by: INTERNAL MEDICINE

## 2023-05-19 NOTE — ASSESSMENT & PLAN NOTE
-Start healthy diet high in fiber (25-35 gm daily), low fat dairy, lean protein, low in saturated/trans fat (minimize cheese, creamy salad dressings); high in calcium/magnesium/potassium; 2.0 gm sodium diet; low in processed sugars and foods, ie  WHITE sugars, bread, pasta, rice, ice cream, cookies, cake, doughnuts  -Drink 6-8 glasses of water daily  -Moderate exercise 30 min 5 times per week or 75 min intense exercise biweekly   -Start 8-10 hour intermittent fasting diet   -Avoid eating 3 hours prior to bedtime  -Reduce alcohol to 1-2 glasses per day  -Avoid smoking, vaping, stimulant drugs/mediations   -Minimize NSAIDs

## 2023-05-20 ENCOUNTER — PATIENT MESSAGE (OUTPATIENT)
Dept: PRIMARY CARE CLINIC | Facility: CLINIC | Age: 28
End: 2023-05-20
Payer: COMMERCIAL

## 2023-06-20 ENCOUNTER — OFFICE VISIT (OUTPATIENT)
Dept: PRIMARY CARE CLINIC | Facility: CLINIC | Age: 28
End: 2023-06-20
Payer: COMMERCIAL

## 2023-06-20 VITALS
WEIGHT: 197.75 LBS | SYSTOLIC BLOOD PRESSURE: 110 MMHG | BODY MASS INDEX: 37.34 KG/M2 | OXYGEN SATURATION: 97 % | HEIGHT: 61 IN | DIASTOLIC BLOOD PRESSURE: 64 MMHG | HEART RATE: 67 BPM

## 2023-06-20 DIAGNOSIS — F41.1 GAD (GENERALIZED ANXIETY DISORDER): ICD-10-CM

## 2023-06-20 DIAGNOSIS — Z12.72 ENCOUNTER FOR PAPANICOLAOU SMEAR OF VAGINA AS PART OF ROUTINE GYNECOLOGICAL EXAMINATION: Primary | ICD-10-CM

## 2023-06-20 DIAGNOSIS — Z01.419 ENCOUNTER FOR PAPANICOLAOU SMEAR OF VAGINA AS PART OF ROUTINE GYNECOLOGICAL EXAMINATION: Primary | ICD-10-CM

## 2023-06-20 DIAGNOSIS — F51.02 ADJUSTMENT INSOMNIA: ICD-10-CM

## 2023-06-20 PROCEDURE — 3008F BODY MASS INDEX DOCD: CPT | Mod: CPTII,S$GLB,, | Performed by: INTERNAL MEDICINE

## 2023-06-20 PROCEDURE — 3078F PR MOST RECENT DIASTOLIC BLOOD PRESSURE < 80 MM HG: ICD-10-PCS | Mod: CPTII,S$GLB,, | Performed by: INTERNAL MEDICINE

## 2023-06-20 PROCEDURE — 3044F HG A1C LEVEL LT 7.0%: CPT | Mod: CPTII,S$GLB,, | Performed by: INTERNAL MEDICINE

## 2023-06-20 PROCEDURE — 3074F SYST BP LT 130 MM HG: CPT | Mod: CPTII,S$GLB,, | Performed by: INTERNAL MEDICINE

## 2023-06-20 PROCEDURE — 3008F PR BODY MASS INDEX (BMI) DOCUMENTED: ICD-10-PCS | Mod: CPTII,S$GLB,, | Performed by: INTERNAL MEDICINE

## 2023-06-20 PROCEDURE — 3044F PR MOST RECENT HEMOGLOBIN A1C LEVEL <7.0%: ICD-10-PCS | Mod: CPTII,S$GLB,, | Performed by: INTERNAL MEDICINE

## 2023-06-20 PROCEDURE — 99999 PR PBB SHADOW E&M-EST. PATIENT-LVL III: ICD-10-PCS | Mod: PBBFAC,,, | Performed by: INTERNAL MEDICINE

## 2023-06-20 PROCEDURE — 99395 PR PREVENTIVE VISIT,EST,18-39: ICD-10-PCS | Mod: S$GLB,,, | Performed by: INTERNAL MEDICINE

## 2023-06-20 PROCEDURE — 3078F DIAST BP <80 MM HG: CPT | Mod: CPTII,S$GLB,, | Performed by: INTERNAL MEDICINE

## 2023-06-20 PROCEDURE — 99999 PR PBB SHADOW E&M-EST. PATIENT-LVL III: CPT | Mod: PBBFAC,,, | Performed by: INTERNAL MEDICINE

## 2023-06-20 PROCEDURE — 3074F PR MOST RECENT SYSTOLIC BLOOD PRESSURE < 130 MM HG: ICD-10-PCS | Mod: CPTII,S$GLB,, | Performed by: INTERNAL MEDICINE

## 2023-06-20 PROCEDURE — 1159F PR MEDICATION LIST DOCUMENTED IN MEDICAL RECORD: ICD-10-PCS | Mod: CPTII,S$GLB,, | Performed by: INTERNAL MEDICINE

## 2023-06-20 PROCEDURE — 88175 CYTOPATH C/V AUTO FLUID REDO: CPT | Performed by: INTERNAL MEDICINE

## 2023-06-20 PROCEDURE — 1159F MED LIST DOCD IN RCRD: CPT | Mod: CPTII,S$GLB,, | Performed by: INTERNAL MEDICINE

## 2023-06-20 PROCEDURE — 99395 PREV VISIT EST AGE 18-39: CPT | Mod: S$GLB,,, | Performed by: INTERNAL MEDICINE

## 2023-06-20 RX ORDER — SERTRALINE HYDROCHLORIDE 50 MG/1
TABLET, FILM COATED ORAL
Qty: 90 TABLET | Refills: 3 | Status: SHIPPED | OUTPATIENT
Start: 2023-06-20

## 2023-06-20 NOTE — PROGRESS NOTES
[facility] Progress Note      Chief Complaint     Follow-up (4-6 week f/u) and Anxiety   for AUNG/insomnia    Subjective:      History of Present Illness:  Cyndi Chen is a 28 y.o. female here for gyn exam and f/u AUNG    Insomnia: doing good sleep hygiene and no longer needs melatonin and vistaril     AUNG: anxiety well-controlled  on Zoloft 50mg po daily with buspar as only needed. Able to focus and get her work done with more motivation. Requests refill of zoloft     GYN: regular cycles monthly; heaviest day with clots and pain x 1-2 days and takes Motrin and Pamprin; bleeds 5-7 days but light x 5 days  Last pap 2018 WNL  FH: Breast cancer: mom 51 y/o when dx'ed but alive  Not sexually active   Currently on cycle       Past Medical History:  Past Medical History:   Diagnosis Date    Allergy        Past Surgical History:  Past Surgical History:   Procedure Laterality Date    MOUTH SURGERY Bilateral        Allergies:  Review of patient's allergies indicates:   Allergen Reactions    Zithromax [azithromycin] Rash       Home Medications:  Current Outpatient Medications   Medication Sig Dispense Refill    busPIRone (BUSPAR) 10 MG tablet Take 1 tablet (10 mg total) by mouth 3 (three) times daily. 90 tablet 11    fluticasone propionate (FLONASE) 50 mcg/actuation nasal spray 1 spray (50 mcg total) by Each Nostril route 2 (two) times a day. 16 g 3    hydrOXYzine pamoate (VISTARIL) 25 MG Cap Take 1 capsule (25 mg total) by mouth 4 (four) times daily. 30 capsule 5    levocetirizine (XYZAL) 5 MG tablet Take 5 mg by mouth every evening.      omeprazole (PRILOSEC) 20 MG capsule Take 1 capsule (20 mg total) by mouth once daily. 30 capsule 11    sertraline (ZOLOFT) 50 MG tablet Take 1 tab po daily 90 tablet 3     No current facility-administered medications for this visit.        Family History:  Family History   Problem Relation Age of Onset    Cancer Mother     No Known Problems Father        Social  "History:  Social History     Tobacco Use    Smoking status: Never    Smokeless tobacco: Never   Substance Use Topics    Alcohol use: Yes    Drug use: No       Review of Systems:  Pertinent positives and negatives listed in HPI. All other systems are reviewed and are negative.    Health Maintaince :   Health Maintenance Topics with due status: Not Due       Topic Last Completion Date    Influenza Vaccine Not Due           Eye:   Dental:     Immunizations:     The ASCVD Risk score (Simba WALKER, et al., 2019) failed to calculate for the following reasons:    The 2019 ASCVD risk score is only valid for ages 40 to 79      Objective:   /64 (BP Location: Left arm, Patient Position: Sitting, BP Method: Large (Manual))   Pulse 67   Ht 5' 1" (1.549 m)   Wt 89.7 kg (197 lb 12 oz)   LMP 06/15/2023 (Approximate)   SpO2 97%   BMI 37.37 kg/m²      Body mass index is 37.37 kg/m².       Physical Examination:  General: Alert and awake in no apparent distress  HEENT: Normocephalic and atraumatic; Tms WNL  Eyes:  PERRL; EOMi with anicteric sclera and clear conjunctivae  Mouth:  Oropharynx clear and without exudate; moist mucous membranes  Breasts:          pendulous without axillary nodes; no skin changes; no masses; no nipple discharge  Neck:   Cervical nodes not enlarged; supple; no bruits  Cardio:  Regular rate and rhythm with normal S1 and S2; no murmurs or rubs  Resp:  CTAB; respirations unlabored; no wheezes, crackles or rhonchi  Abdom: Soft, NTND with normoactive bowel sounds; negative HSM  Extrem: Warm and well-perfused with no clubbing, cyanosis or edema  GYN               normal rugae; mild bleeding from cervix without lesions; no CMT/adnexal fullness or tenderness  Skin:  No rashes, lesions, or color changes  Pulses:  2+ and symmetric distally  Neuro:  AAOx3; cooperative and pleasant with no focal deficits    Laboratory:      Most Recent Data:  Lab Results   Component Value Date    WBC 8.50 05/19/2023    HGB 13.2 " 05/19/2023    HCT 40.8 05/19/2023     05/19/2023    CHOL 142 05/19/2023    TRIG 56 05/19/2023    HDL 47 05/19/2023    ALT 22 05/19/2023    AST 24 05/19/2023     05/19/2023    K 4.2 05/19/2023     05/19/2023    BUN 11 05/19/2023    CO2 26 05/19/2023    HGBA1C 4.8 05/19/2023              CBC:   WBC   Date Value Ref Range Status   05/19/2023 8.50 3.90 - 12.70 K/uL Final     Hemoglobin   Date Value Ref Range Status   05/19/2023 13.2 12.0 - 16.0 g/dL Final     Hematocrit   Date Value Ref Range Status   05/19/2023 40.8 37.0 - 48.5 % Final     Platelets   Date Value Ref Range Status   05/19/2023 298 150 - 450 K/uL Final     MCV   Date Value Ref Range Status   05/19/2023 93 82 - 98 fL Final     RDW   Date Value Ref Range Status   05/19/2023 12.1 11.5 - 14.5 % Final     BMP:   Sodium   Date Value Ref Range Status   05/19/2023 139 136 - 145 mmol/L Final     Potassium   Date Value Ref Range Status   05/19/2023 4.2 3.5 - 5.1 mmol/L Final     Chloride   Date Value Ref Range Status   05/19/2023 107 95 - 110 mmol/L Final     CO2   Date Value Ref Range Status   05/19/2023 26 23 - 29 mmol/L Final     BUN   Date Value Ref Range Status   05/19/2023 11 6 - 20 mg/dL Final     Creatinine   Date Value Ref Range Status   05/19/2023 0.8 0.5 - 1.4 mg/dL Final     Glucose   Date Value Ref Range Status   05/19/2023 85 70 - 110 mg/dL Final     Calcium   Date Value Ref Range Status   05/19/2023 9.2 8.7 - 10.5 mg/dL Final     LFTs:   Total Protein   Date Value Ref Range Status   05/19/2023 7.1 6.0 - 8.4 g/dL Final     Albumin   Date Value Ref Range Status   05/19/2023 3.7 3.5 - 5.2 g/dL Final     Total Bilirubin   Date Value Ref Range Status   05/19/2023 0.9 0.1 - 1.0 mg/dL Final     Comment:     For infants and newborns, interpretation of results should be based  on gestational age, weight and in agreement with clinical  observations.    Premature Infant recommended reference ranges:  Up to 24 hours.............<8.0 mg/dL  Up  to 48 hours............<12.0 mg/dL  3-5 days..................<15.0 mg/dL  6-29 days.................<15.0 mg/dL       AST   Date Value Ref Range Status   05/19/2023 24 10 - 40 U/L Final     Alkaline Phosphatase   Date Value Ref Range Status   05/19/2023 94 55 - 135 U/L Final     ALT   Date Value Ref Range Status   05/19/2023 22 10 - 44 U/L Final     Coags: No results found for: INR, PROTIME, PTT  FLP:      Lab Results   Component Value Date    CHOL 142 05/19/2023     Lab Results   Component Value Date    HDL 47 05/19/2023     Lab Results   Component Value Date    LDLCALC 83.8 05/19/2023     Lab Results   Component Value Date    TRIG 56 05/19/2023     Lab Results   Component Value Date    CHOLHDL 33.1 05/19/2023      DM:      Hemoglobin A1C   Date Value Ref Range Status   05/19/2023 4.8 4.0 - 5.6 % Final     Comment:     ADA Screening Guidelines:  5.7-6.4%  Consistent with prediabetes  >or=6.5%  Consistent with diabetes    High levels of fetal hemoglobin interfere with the HbA1C  assay. Heterozygous hemoglobin variants (HbS, HgC, etc)do  not significantly interfere with this assay.   However, presence of multiple variants may affect accuracy.       LDL Cholesterol   Date Value Ref Range Status   05/19/2023 83.8 63.0 - 159.0 mg/dL Final     Comment:     The National Cholesterol Education Program (NCEP) has set the  following guidelines (reference values) for LDL Cholesterol:  Optimal.......................<130 mg/dL  Borderline High...............130-159 mg/dL  High..........................160-189 mg/dL  Very High.....................>190 mg/dL       Creatinine   Date Value Ref Range Status   05/19/2023 0.8 0.5 - 1.4 mg/dL Final     Thyroid: No results found for: TSH, FREET4, J2QOXOI, R5TBPAV, THYROIDAB  Anemia: No results found for: IRON, TIBC, FERRITIN, [id number], FOLATE  Cardiac: No results found for: TROPONINI, CKTOTAL, CKMB, BNP  Urinalysis:   Color, UA   Date Value Ref Range Status   05/19/2023 Yellow Yellow,  Straw, Sravanthi Final     Specific Gravity, UA   Date Value Ref Range Status   05/19/2023 1.025 1.005 - 1.030 Final     Nitrite, UA   Date Value Ref Range Status   05/19/2023 Negative Negative Final     Ketones, UA   Date Value Ref Range Status   05/19/2023 Trace (A) Negative Final     WBC, UA   Date Value Ref Range Status   05/19/2023 17 (H) 0 - 5 /hpf Final       Other Results:  EKG (my interpretation):     Radiology:  No image results found.          Assessment/Plan     Cyndi Chen is a 28 y.o. female with:  1. Encounter for Papanicolaou smear of vagina as part of routine gynecological examination  Assessment & Plan:  Will call if abnormal results    Orders:  -     Liquid-Based Pap Smear, Screening    2. AUNG (generalized anxiety disorder)  Overview:  AUNG 7:  15    Assessment & Plan:  Refill Zoloft 50mg po daily  Take Buspar prn     Orders:  -     sertraline (ZOLOFT) 50 MG tablet; Take 1 tab po daily  Dispense: 90 tablet; Refill: 3    3. Adjustment insomnia  Assessment & Plan:  Stable without melatonin or vistaril               I spent a total of 15 minutes on the day of the visit.This includes face to face time and non-face to face time preparing to see the patient (eg, review of tests), obtaining and/or reviewing separately obtained history, documenting clinical information in the electronic or other health record, independently interpreting results and communicating results to the patient/family/caregiver, or care coordinator.   Code Status:     Yaneth Andrew MD

## 2023-06-22 LAB
FINAL PATHOLOGIC DIAGNOSIS: NORMAL
Lab: NORMAL

## 2024-03-26 ENCOUNTER — OFFICE VISIT (OUTPATIENT)
Dept: PRIMARY CARE CLINIC | Facility: CLINIC | Age: 29
End: 2024-03-26
Payer: COMMERCIAL

## 2024-03-26 DIAGNOSIS — Z00.00 PREVENTATIVE HEALTH CARE: ICD-10-CM

## 2024-03-26 DIAGNOSIS — N89.8 VAGINAL DISCHARGE: ICD-10-CM

## 2024-03-26 DIAGNOSIS — Z30.09 ENCOUNTER FOR OTHER GENERAL COUNSELING OR ADVICE ON CONTRACEPTION: Primary | ICD-10-CM

## 2024-03-26 PROBLEM — N76.1 CHRONIC VAGINITIS: Status: RESOLVED | Noted: 2024-03-26 | Resolved: 2024-03-26

## 2024-03-26 PROBLEM — Z30.8 ENCOUNTER FOR OTHER CONTRACEPTIVE MANAGEMENT: Status: RESOLVED | Noted: 2023-05-18 | Resolved: 2024-03-26

## 2024-03-26 PROBLEM — Z12.72 ENCOUNTER FOR PAPANICOLAOU SMEAR OF VAGINA AS PART OF ROUTINE GYNECOLOGICAL EXAMINATION: Status: RESOLVED | Noted: 2023-05-18 | Resolved: 2024-03-26

## 2024-03-26 PROBLEM — Z01.419 ENCOUNTER FOR PAPANICOLAOU SMEAR OF VAGINA AS PART OF ROUTINE GYNECOLOGICAL EXAMINATION: Status: RESOLVED | Noted: 2023-05-18 | Resolved: 2024-03-26

## 2024-03-26 PROBLEM — Z30.8 ENCOUNTER FOR OTHER CONTRACEPTIVE MANAGEMENT: Status: ACTIVE | Noted: 2023-05-18

## 2024-03-26 PROBLEM — N76.1 CHRONIC VAGINITIS: Status: ACTIVE | Noted: 2024-03-26

## 2024-03-26 PROCEDURE — 99213 OFFICE O/P EST LOW 20 MIN: CPT | Mod: 95,,, | Performed by: INTERNAL MEDICINE

## 2024-03-26 NOTE — PROGRESS NOTES
[facility] Progress Note      Audiovisual Visit  Location:  [place], [place]      Chief Complaint     No chief complaint on file.   for vaginal discharge    Subjective:      History of Present Illness:  Cyndi Chen is a 29 y.o. female     C/o clear profuse non-foul-smelling vaginal discharge during mid-cycle associated with protected sex with condom use; pt also gets her typical LBP for which she takes Tylenol; has new partner x 2 mo    Contraception: stopped OCPs in the past due to significant weight gain but would like to pursue other options; OCPs definitely regulated her cycles and resulted in minimal bleeding/cramps     Past Medical History:  Past Medical History:   Diagnosis Date    Allergy     Encounter for Papanicolaou smear of vagina as part of routine gynecological examination 05/18/2023       Past Surgical History:  Past Surgical History:   Procedure Laterality Date    MOUTH SURGERY Bilateral        Allergies:  Review of patient's allergies indicates:   Allergen Reactions    Zithromax [azithromycin] Rash       Home Medications:  Current Outpatient Medications   Medication Sig Dispense Refill    busPIRone (BUSPAR) 10 MG tablet Take 1 tablet (10 mg total) by mouth 3 (three) times daily. 90 tablet 11    fluticasone propionate (FLONASE) 50 mcg/actuation nasal spray 1 spray (50 mcg total) by Each Nostril route 2 (two) times a day. 16 g 3    hydrOXYzine pamoate (VISTARIL) 25 MG Cap Take 1 capsule (25 mg total) by mouth 4 (four) times daily. 30 capsule 5    levocetirizine (XYZAL) 5 MG tablet Take 5 mg by mouth every evening.      omeprazole (PRILOSEC) 20 MG capsule Take 1 capsule (20 mg total) by mouth once daily. 30 capsule 11    sertraline (ZOLOFT) 50 MG tablet Take 1 tab po daily 90 tablet 3     No current facility-administered medications for this visit.        Family History:  Family History   Problem Relation Age of Onset    Cancer Mother     No Known Problems Father         Social History:  Social History     Tobacco Use    Smoking status: Never    Smokeless tobacco: Never   Substance Use Topics    Alcohol use: Yes    Drug use: No       Review of Systems:  Pertinent positives and negatives listed in HPI. All other systems are reviewed and are negative.    Health Maintaince :   Health Maintenance Topics with due status: Not Due       Topic Last Completion Date    TETANUS VACCINE 10/04/2019           Eye:   Dental:     Immunizations:   The ASCVD Risk score (Simba WALKER, et al., 2019) failed to calculate for the following reasons:    The 2019 ASCVD risk score is only valid for ages 40 to 79      Objective:   There were no vitals taken for this visit.     There is no height or weight on file to calculate BMI.       Physical Examination:  General: Alert and awake in no apparent distress  Neuro:  AAOx3; cooperative and pleasant with no focal deficits    Laboratory:      Most Recent Data:  Lab Results   Component Value Date    WBC 8.50 05/19/2023    HGB 13.2 05/19/2023    HCT 40.8 05/19/2023     05/19/2023    CHOL 142 05/19/2023    TRIG 56 05/19/2023    HDL 47 05/19/2023    ALT 22 05/19/2023    AST 24 05/19/2023     05/19/2023    K 4.2 05/19/2023     05/19/2023    BUN 11 05/19/2023    CO2 26 05/19/2023    HGBA1C 4.8 05/19/2023              CBC:   WBC   Date Value Ref Range Status   05/19/2023 8.50 3.90 - 12.70 K/uL Final     Hemoglobin   Date Value Ref Range Status   05/19/2023 13.2 12.0 - 16.0 g/dL Final     Hematocrit   Date Value Ref Range Status   05/19/2023 40.8 37.0 - 48.5 % Final     Platelets   Date Value Ref Range Status   05/19/2023 298 150 - 450 K/uL Final     MCV   Date Value Ref Range Status   05/19/2023 93 82 - 98 fL Final     RDW   Date Value Ref Range Status   05/19/2023 12.1 11.5 - 14.5 % Final     BMP:   Sodium   Date Value Ref Range Status   05/19/2023 139 136 - 145 mmol/L Final     Potassium   Date Value Ref Range Status   05/19/2023 4.2 3.5 - 5.1  "mmol/L Final     Chloride   Date Value Ref Range Status   05/19/2023 107 95 - 110 mmol/L Final     CO2   Date Value Ref Range Status   05/19/2023 26 23 - 29 mmol/L Final     BUN   Date Value Ref Range Status   05/19/2023 11 6 - 20 mg/dL Final     Creatinine   Date Value Ref Range Status   05/19/2023 0.8 0.5 - 1.4 mg/dL Final     Glucose   Date Value Ref Range Status   05/19/2023 85 70 - 110 mg/dL Final     Calcium   Date Value Ref Range Status   05/19/2023 9.2 8.7 - 10.5 mg/dL Final     LFTs:   Total Protein   Date Value Ref Range Status   05/19/2023 7.1 6.0 - 8.4 g/dL Final     Albumin   Date Value Ref Range Status   05/19/2023 3.7 3.5 - 5.2 g/dL Final     Total Bilirubin   Date Value Ref Range Status   05/19/2023 0.9 0.1 - 1.0 mg/dL Final     Comment:     For infants and newborns, interpretation of results should be based  on gestational age, weight and in agreement with clinical  observations.    Premature Infant recommended reference ranges:  Up to 24 hours.............<8.0 mg/dL  Up to 48 hours............<12.0 mg/dL  3-5 days..................<15.0 mg/dL  6-29 days.................<15.0 mg/dL       AST   Date Value Ref Range Status   05/19/2023 24 10 - 40 U/L Final     Alkaline Phosphatase   Date Value Ref Range Status   05/19/2023 94 55 - 135 U/L Final     ALT   Date Value Ref Range Status   05/19/2023 22 10 - 44 U/L Final     Coags: No results found for: "INR", "PROTIME", "PTT"  FLP:      Lab Results   Component Value Date    CHOL 142 05/19/2023     Lab Results   Component Value Date    HDL 47 05/19/2023     Lab Results   Component Value Date    LDLCALC 83.8 05/19/2023     Lab Results   Component Value Date    TRIG 56 05/19/2023     Lab Results   Component Value Date    CHOLHDL 33.1 05/19/2023      DM:      Hemoglobin A1C   Date Value Ref Range Status   05/19/2023 4.8 4.0 - 5.6 % Final     Comment:     ADA Screening Guidelines:  5.7-6.4%  Consistent with prediabetes  >or=6.5%  Consistent with " "diabetes    High levels of fetal hemoglobin interfere with the HbA1C  assay. Heterozygous hemoglobin variants (HbS, HgC, etc)do  not significantly interfere with this assay.   However, presence of multiple variants may affect accuracy.       LDL Cholesterol   Date Value Ref Range Status   05/19/2023 83.8 63.0 - 159.0 mg/dL Final     Comment:     The National Cholesterol Education Program (NCEP) has set the  following guidelines (reference values) for LDL Cholesterol:  Optimal.......................<130 mg/dL  Borderline High...............130-159 mg/dL  High..........................160-189 mg/dL  Very High.....................>190 mg/dL       Creatinine   Date Value Ref Range Status   05/19/2023 0.8 0.5 - 1.4 mg/dL Final     Thyroid: No results found for: "TSH", "FREET4", "V2TBHJO", "T5AJODT", "THYROIDAB"  Anemia: No results found for: "IRON", "TIBC", "FERRITIN", "RQZKKSPH02", "FOLATE"  Cardiac: No results found for: "TROPONINI", "CKTOTAL", "CKMB", "BNP"  Urinalysis:   Color, UA   Date Value Ref Range Status   05/19/2023 Yellow Yellow, Straw, Sravanthi Final     Specific Gravity, UA   Date Value Ref Range Status   05/19/2023 1.025 1.005 - 1.030 Final     Nitrite, UA   Date Value Ref Range Status   05/19/2023 Negative Negative Final     Ketones, UA   Date Value Ref Range Status   05/19/2023 Trace (A) Negative Final     WBC, UA   Date Value Ref Range Status   05/19/2023 17 (H) 0 - 5 /hpf Final       Other Results:  EKG (my interpretation):     Radiology:  No image results found.          Assessment/Plan     Cyndi Chen is a 29 y.o. female with:  1. Encounter for other general counseling or advice on contraception  Assessment & Plan:  Refer to GYN for possible IUD placement     Orders:  -     Ambulatory referral/consult to Gynecology; Future; Expected date: 03/26/2024    2. Preventative health care  Assessment & Plan:  Refer to GYN for possible IUD placement       3. Vaginal discharge  Assessment & Plan:  Likely " benign etiology but cont to wear condoms consistently  -pay attention to change in color, odor, frequency               I spent 23 min with this pt that includes face to face time and non-face to face time preparing to see the patient (eg, review of tests), obtaining and/or reviewing separately obtained history, documenting clinical information in the electronic or other health record, independently interpreting results and communicating results to the patient/family/caregiver, or care coordinator.   Code Status:     Yaneth Andrew MD

## 2024-03-26 NOTE — ASSESSMENT & PLAN NOTE
Likely benign etiology but cont to wear condoms consistently  -pay attention to change in color, odor, frequency

## 2024-04-28 NOTE — PROGRESS NOTES
Chief Complaint: BC Consult      HPI:      Cyndi is a 29 y.o. No obstetric history on file. who presents today for birth control consult.     She was previously on OCPs but stopped in 2019 due to weight gain, negative impact on her mood and was difficult for her to remember to take pills every day.   She was initially started on OCPs because her periods were heavy and painful.   Since stopping her OCPs, periods have become progressively more intense.   LMP: 3/26. On average - periods every 4 weeks, last 5 days with moderate flow, will notice a lot of clots with bleeding.   She will have pelvic and back pain the 2 weeks prior to her period. She also feels lethargic and weak 2 weeks prior to period. 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   She has also noticed hot flashses during her period and randomly throughout cycle.     She will take Ibuprofen for her pain which provides relief.     She denies pain w/ sex.   She had 1 episode of noticing a lot of vaginal discharge during ovulation. Resolved. No other vaginal concerns.     Cyndi  is currently sexually active . She is currently using condoms for contraception.     Previous Pap: NILM (6/22/2023)    Gardasil:Completed       OB History    No obstetric history on file.       ROS:     GENERAL: Feeling well overall.   CARDIOVASCULAR: Denies palpitations or chest pain.   RESPIRATORY: Denies shortness of breath.  BREASTS: see HPI.  ABDOMEN: Denies constipation, diarrhea, blood in stool.  URINARY: see HPI.  REPRODUCTIVE: see HPI.  PSYCHIATRIC: Denies uncontrolled depression or anxiety.    Physical Exam:      Physical Exam     /75   Wt 88.8 kg (195 lb 12.3 oz)   LMP 03/26/2024   BMI 36.99 kg/m²   Body mass index is 36.99 kg/m².     APPEARANCE: Well nourished, well developed, in no acute distress.  PSYCH: Appropriate mood and affect.  SKIN: No acne or hirsutism.      Assessment/Plan:     Encounter for other general  counseling or advice on contraception  -     Ambulatory referral/consult to Gynecology  -     POCT Urine Pregnancy  -     Device Authorization Order    Dysmenorrhea  -     Device Authorization Order    Hot flashes  -     TSH; Future; Expected date: 04/29/2024  -     T4, Free; Future; Expected date: 04/29/2024      PLAN:    Pap test up to date - due 6/2026   GC/CT  - will collect at time of IUD insertion   Gardasil up to date  TSH/FT4 ordered for further evaluation of hot flash complaint   Contraception - After discussing R/B/As of fertility based awareness, barrier contraception, hormonal pills, injections, patches, rings, hormonal and non-hormonal IUDs, and the subdermal implant, all questions were answered, and she has opted for Mirena IUD insertion.  Today's discussion included:  Risks of IUD placement including but not limited to uterine perforation, infection, migration of device, or IUD expulsion.  Potential changes in bleeding patterns from increased bleeding, intermenstrual spotting, or amenorrhea.   The 0.2% risk of pregnancy with an IUD in place. Patient was thoroughly counseled that if she does become pregnant while she has an IUD, there is an increased risk of both miscarriage and ectopic pregnancy. She was advised to seek medical care immediately if she were to become pregnant.  The need for barrier contraception to prevent exposure to sexually transmitted diseases. Ms. Chen was clearly counseled that an IUD cannot protect her against diseases such as HIV, herpes, and others.     Follow up for Mirena IUD insertion and GC/CT screening.    Counseling:     Patient was counseled today on the recommendation for yearly wellness exams, importance of breast self awareness and annual mammograms, as well as the current ASCCP pap guidelines. She was counseled on importance of regular exercise. She is to see her PCP for other health maintenance.     Use of the [facility] Patient Portal discussed and encouraged  during today's visit.

## 2024-04-29 ENCOUNTER — OFFICE VISIT (OUTPATIENT)
Dept: OBSTETRICS AND GYNECOLOGY | Facility: CLINIC | Age: 29
End: 2024-04-29
Payer: COMMERCIAL

## 2024-04-29 ENCOUNTER — LAB VISIT (OUTPATIENT)
Dept: LAB | Facility: HOSPITAL | Age: 29
End: 2024-04-29
Payer: COMMERCIAL

## 2024-04-29 VITALS
SYSTOLIC BLOOD PRESSURE: 109 MMHG | DIASTOLIC BLOOD PRESSURE: 75 MMHG | BODY MASS INDEX: 36.99 KG/M2 | WEIGHT: 195.75 LBS

## 2024-04-29 DIAGNOSIS — F41.1 GAD (GENERALIZED ANXIETY DISORDER): ICD-10-CM

## 2024-04-29 DIAGNOSIS — Z30.09 ENCOUNTER FOR OTHER GENERAL COUNSELING OR ADVICE ON CONTRACEPTION: Primary | ICD-10-CM

## 2024-04-29 DIAGNOSIS — N94.6 DYSMENORRHEA: ICD-10-CM

## 2024-04-29 DIAGNOSIS — R23.2 HOT FLASHES: ICD-10-CM

## 2024-04-29 LAB
B-HCG UR QL: NEGATIVE
CTP QC/QA: YES
T4 FREE SERPL-MCNC: 0.88 NG/DL (ref 0.71–1.51)
TSH SERPL DL<=0.005 MIU/L-ACNC: 1.58 UIU/ML (ref 0.4–4)

## 2024-04-29 PROCEDURE — 99999 PR PBB SHADOW E&M-EST. PATIENT-LVL III: CPT | Mod: PBBFAC,,, | Performed by: NURSE PRACTITIONER

## 2024-04-29 PROCEDURE — 84443 ASSAY THYROID STIM HORMONE: CPT | Performed by: NURSE PRACTITIONER

## 2024-04-29 PROCEDURE — 36415 COLL VENOUS BLD VENIPUNCTURE: CPT | Performed by: NURSE PRACTITIONER

## 2024-04-29 PROCEDURE — 3078F DIAST BP <80 MM HG: CPT | Mod: CPTII,S$GLB,, | Performed by: NURSE PRACTITIONER

## 2024-04-29 PROCEDURE — 1159F MED LIST DOCD IN RCRD: CPT | Mod: CPTII,S$GLB,, | Performed by: NURSE PRACTITIONER

## 2024-04-29 PROCEDURE — 3074F SYST BP LT 130 MM HG: CPT | Mod: CPTII,S$GLB,, | Performed by: NURSE PRACTITIONER

## 2024-04-29 PROCEDURE — 81025 URINE PREGNANCY TEST: CPT | Mod: S$GLB,,, | Performed by: NURSE PRACTITIONER

## 2024-04-29 PROCEDURE — 3008F BODY MASS INDEX DOCD: CPT | Mod: CPTII,S$GLB,, | Performed by: NURSE PRACTITIONER

## 2024-04-29 PROCEDURE — 84439 ASSAY OF FREE THYROXINE: CPT | Performed by: NURSE PRACTITIONER

## 2024-04-29 PROCEDURE — 99203 OFFICE O/P NEW LOW 30 MIN: CPT | Mod: S$GLB,,, | Performed by: NURSE PRACTITIONER

## 2024-04-29 RX ORDER — BUSPIRONE HYDROCHLORIDE 10 MG/1
10 TABLET ORAL 3 TIMES DAILY
Qty: 90 TABLET | Refills: 11 | Status: SHIPPED | OUTPATIENT
Start: 2024-04-29 | End: 2025-04-29

## 2024-04-29 NOTE — TELEPHONE ENCOUNTER
No care due was identified.  [facility] Embedded Care Due Messages. Reference number: [id number].   4/29/2024 10:07:21 AM CDT

## 2024-05-23 ENCOUNTER — OFFICE VISIT (OUTPATIENT)
Dept: URGENT CARE | Facility: CLINIC | Age: 29
End: 2024-05-23
Payer: COMMERCIAL

## 2024-05-23 VITALS
HEIGHT: 61 IN | TEMPERATURE: 100 F | RESPIRATION RATE: 18 BRPM | SYSTOLIC BLOOD PRESSURE: 114 MMHG | BODY MASS INDEX: 36.82 KG/M2 | WEIGHT: 195 LBS | DIASTOLIC BLOOD PRESSURE: 81 MMHG | OXYGEN SATURATION: 98 % | HEART RATE: 96 BPM

## 2024-05-23 DIAGNOSIS — J02.9 SORE THROAT: Primary | ICD-10-CM

## 2024-05-23 DIAGNOSIS — J06.9 VIRAL URI: ICD-10-CM

## 2024-05-23 LAB
CTP QC/QA: YES
MOLECULAR STREP A: NEGATIVE
POC MOLECULAR INFLUENZA A AGN: NEGATIVE
POC MOLECULAR INFLUENZA B AGN: NEGATIVE
SARS-COV-2 AG RESP QL IA.RAPID: NEGATIVE

## 2024-05-23 PROCEDURE — 87811 SARS-COV-2 COVID19 W/OPTIC: CPT | Mod: QW,S$GLB,, | Performed by: NURSE PRACTITIONER

## 2024-05-23 PROCEDURE — 87651 STREP A DNA AMP PROBE: CPT | Mod: QW,S$GLB,, | Performed by: NURSE PRACTITIONER

## 2024-05-23 PROCEDURE — 99204 OFFICE O/P NEW MOD 45 MIN: CPT | Mod: S$GLB,,, | Performed by: NURSE PRACTITIONER

## 2024-05-23 PROCEDURE — 87502 INFLUENZA DNA AMP PROBE: CPT | Mod: QW,S$GLB,, | Performed by: NURSE PRACTITIONER

## 2024-05-23 NOTE — LETTER
May 23, 2024      [facility]  [address] [facility] [facility] [address]  Phone: [phone]  Fax: [phone]       Patient: Cyndi Chen   YOB: 1995  Date of Visit: 05/23/2024    To Whom It May Concern:    Duyen Chen  was at [facility] on 05/23/2024. The patient may return to work/school on 5/25/2024 with no restrictions. If you have any questions or concerns, or if I can be of further assistance, please do not hesitate to contact me.    Sincerely,    AGNIESZKA Watts

## 2024-05-23 NOTE — PROGRESS NOTES
"Subjective:      Patient ID: Cyndi Chen is a 29 y.o. female.    Vitals:  height is 5' 1" (1.549 m) and weight is 88.5 kg (195 lb). Her oral temperature is 99.5 °F (37.5 °C). Her blood pressure is 114/81 and her pulse is 96. Her respiration is 18 and oxygen saturation is 98%.     Chief Complaint: Sore Throat    Pt state she started with a sore throat on Tuesday, followed by body aches, chills, fatigue, fever, sweats. Tylenol and Dayquil were used to help treat.    Sore Throat   This is a new problem. Episode onset: Tuesday. The problem has been gradually worsening. There has been no fever. The pain is at a severity of 7/10. Associated symptoms include trouble swallowing ("hurts"). Pertinent negatives include no congestion, coughing, diarrhea, ear pain, headaches, shortness of breath or vomiting.       Constitution: Positive for chills, fatigue and fever.   HENT:  Positive for sore throat and trouble swallowing ("hurts"). Negative for ear pain, congestion and sinus pain.    Cardiovascular:  Negative for chest pain.   Respiratory:  Negative for cough, sputum production and shortness of breath.    Gastrointestinal:  Negative for nausea, vomiting and diarrhea.   Musculoskeletal:  Positive for muscle ache.   Neurological:  Negative for headaches.      Objective:     Physical Exam   Constitutional: She is oriented to person, place, and time.   HENT:   Head: Normocephalic.   Ears:   Right Ear: Hearing and external ear normal. No no drainage, swelling or tenderness. Tympanic membrane is not erythematous, not retracted and not bulging. No middle ear effusion.   Left Ear: Hearing and external ear normal. No no drainage, swelling or tenderness. Tympanic membrane is not erythematous, not retracted and not bulging.  No middle ear effusion.   Nose: Nose normal. No mucosal edema, rhinorrhea or purulent discharge. Right sinus exhibits no maxillary sinus tenderness and no frontal sinus tenderness. Left sinus exhibits no " "maxillary sinus tenderness and no frontal sinus tenderness.   Mouth/Throat: Uvula is midline and mucous membranes are normal. No trismus in the jaw. No uvula swelling. Posterior oropharyngeal erythema present. No oropharyngeal exudate or posterior oropharyngeal edema. Tonsils are 3+ on the right. Tonsils are 3+ on the left. Tonsillar exudate.   Cardiovascular: Normal rate and regular rhythm.   Pulmonary/Chest: Effort normal and breath sounds normal.   Neurological: She is alert and oriented to person, place, and time.   Skin: Skin is warm and dry.   Nursing note and vitals reviewed.      Assessment:     1. Sore throat    2. Viral URI        Plan:       Sore throat  -     POCT Influenza A/B MOLECULAR  -     POCT Strep A, Molecular  -     SARS Coronavirus 2 Antigen, POCT Manual Read    Viral URI      Patient Instructions   Sore throat  -     POCT Influenza A/B MOLECULAR  -     POCT Strep A, Molecular  -     SARS Coronavirus 2 Antigen, POCT Manual Read    Viral URI (upper respiratory infection):    Your symptoms are viral in nature.  Viral upper respiratory infections typically run their course in 7-10 days.     - Rest at home.     - Drink plenty of fluids so you won't get dehydrated.      Avoid taking Decongestants such as pseudoephedrine (ex. Sudafed) or phenylephrine (ex. Mucinex FastMax, Dayquil, Nyquil, or any combo cold meds that say "cold," "sinus" or "-D").        - Cough recommendations:   Warm tea with honey can help with cough. Honey is a natural cough suppressant.  - Dextromethorphan (DM) is a cough suppressant over the counter (ie. mucinex DM OR delsym).        - Congestion recommendations:      - Mucinex (guaifenesin) twice a day (or as directed) to help loosen mucous.       - Fever/Pain recommendations:  Alternate Tylenol or Ibuprofen as directed for fever/pain.   - Motrin/Ibuprofen every 6-8 hours for pain and inflammation. Do not take ibuprofen if you have a history of GI bleeding, kidney disease, or " if you take blood thinners.    - Tylenol/acetaminophen every 6-8 hours for added pain relief.  Avoid tylenol if you have a history of liver disease.       -Sore throat recommendations: Warm fluids, warm salt water gargles, throat lozenges, tea, honey, soup, or drinking something cold or frozen.  Throat lozenges or sprays help reduce pain. Gargling with warm saltwater (1/4 teaspoon of salt in 1/2 cup of warm water) or an OTC anesthetic gargle may be useful for irritation.    When to seek medical advice  Call your healthcare provider right away if any of these occur:  Fever that is poorly controlled with OTC fever reducing medication  New or worsening ear pain, sinus pain, or headache  Stiff neck  You can't swallow liquids or you can't open your mouth wide because of throat pain  Signs of dehydration. These include very dark urine or no urine, sunken eyes, and dizziness.  Trouble breathing or noisy breathing  Muffled voice  Rash     If your symptoms worsen or fail to improve you should go to Emergency Department.

## 2024-05-23 NOTE — LETTER
May 23, 2024      [facility]  [address] [facility] [address]  Phone: [phone]  Fax: [phone]       Patient: Cyndi Chen   YOB: 1995  Date of Visit: 05/23/2024    To Whom It May Concern:    Duyen Chen  was at [facility] on 05/23/2024. The patient may return to work/school on 5/26/2024 with no restrictions. If you have any questions or concerns, or if I can be of further assistance, please do not hesitate to contact me.    Sincerely,    AGNIESZKA Watts

## 2024-05-24 NOTE — PATIENT INSTRUCTIONS
"Sore throat  -     POCT Influenza A/B MOLECULAR  -     POCT Strep A, Molecular  -     SARS Coronavirus 2 Antigen, POCT Manual Read    Viral URI (upper respiratory infection):    Your symptoms are viral in nature.  Viral upper respiratory infections typically run their course in 7-10 days.     - Rest at home.     - Drink plenty of fluids so you won't get dehydrated.      Avoid taking Decongestants such as pseudoephedrine (ex. Sudafed) or phenylephrine (ex. Mucinex FastMax, Dayquil, Nyquil, or any combo cold meds that say "cold," "sinus" or "-D").        - Cough recommendations:   Warm tea with honey can help with cough. Honey is a natural cough suppressant.  - Dextromethorphan (DM) is a cough suppressant over the counter (ie. mucinex DM OR delsym).        - Congestion recommendations:      - Mucinex (guaifenesin) twice a day (or as directed) to help loosen mucous.       - Fever/Pain recommendations:  Alternate Tylenol or Ibuprofen as directed for fever/pain.   - Motrin/Ibuprofen every 6-8 hours for pain and inflammation. Do not take ibuprofen if you have a history of GI bleeding, kidney disease, or if you take blood thinners.    - Tylenol/acetaminophen every 6-8 hours for added pain relief.  Avoid tylenol if you have a history of liver disease.       -Sore throat recommendations: Warm fluids, warm salt water gargles, throat lozenges, tea, honey, soup, or drinking something cold or frozen.  Throat lozenges or sprays help reduce pain. Gargling with warm saltwater (1/4 teaspoon of salt in 1/2 cup of warm water) or an OTC anesthetic gargle may be useful for irritation.    When to seek medical advice  Call your healthcare provider right away if any of these occur:  Fever that is poorly controlled with OTC fever reducing medication  New or worsening ear pain, sinus pain, or headache  Stiff neck  You can't swallow liquids or you can't open your mouth wide because of throat pain  Signs of dehydration. These include very dark " urine or no urine, sunken eyes, and dizziness.  Trouble breathing or noisy breathing  Muffled voice  Rash     If your symptoms worsen or fail to improve you should go to Emergency Department.

## 2024-05-27 ENCOUNTER — PROCEDURE VISIT (OUTPATIENT)
Dept: OBSTETRICS AND GYNECOLOGY | Facility: CLINIC | Age: 29
End: 2024-05-27
Payer: COMMERCIAL

## 2024-05-27 VITALS — WEIGHT: 188.69 LBS | BODY MASS INDEX: 35.63 KG/M2 | HEIGHT: 61 IN

## 2024-05-27 DIAGNOSIS — Z11.3 ROUTINE SCREENING FOR STI (SEXUALLY TRANSMITTED INFECTION): ICD-10-CM

## 2024-05-27 DIAGNOSIS — Z30.430 ENCOUNTER FOR IUD INSERTION: Primary | ICD-10-CM

## 2024-05-27 LAB
B-HCG UR QL: NEGATIVE
CTP QC/QA: YES

## 2024-05-27 PROCEDURE — 81025 URINE PREGNANCY TEST: CPT | Mod: S$GLB,,, | Performed by: NURSE PRACTITIONER

## 2024-05-27 PROCEDURE — 99499 UNLISTED E&M SERVICE: CPT | Mod: S$GLB,,, | Performed by: NURSE PRACTITIONER

## 2024-05-27 PROCEDURE — 58300 INSERT INTRAUTERINE DEVICE: CPT | Mod: S$GLB,,, | Performed by: NURSE PRACTITIONER

## 2024-05-27 NOTE — PROCEDURES
Insertion of IUD    Date/Time: 5/27/2024 9:00 AM    Performed by: Cris Ruby NP  Authorized by: Cris Ruby NP    Consent:     Consent obtained:  Prior to procedure the appropriate consent was completed and verified    Consent given by:  Patient    Procedure risks and benefits discussed: yes      Patient questions answered: yes      Patient agrees, verbalizes understanding, and wants to proceed: yes     Device to be inserted was verified by patient: yes    Educational handouts given: no      Instructions and paperwork completed: yes    Insertion Procedure:   1 Intra Uterine Device levonorgestreL 21 mcg/24 hr (8 yrs) 52 mg       Pelvic exam performed: yes      Negative GC/chlamydia test: no (collected today)      Negative urine pregnancy test: yes      Negative serum pregnancy test: no      Cervix cleaned and prepped: yes      Speculum placed in vagina: yes      Tenaculum applied to cervix: yes      Uterus sounded: yes      Uterus sound depth (cm):  7    IUD inserted with no complications: yes      IUD type:  Mirena    Strings trimmed: yes    Post-procedure:     Patient tolerated procedure well: yes (pt had mild vasovagal reaction, recovered well)      Patient will follow up after next period: yes    Comments:      Hemostasis was noted    PLAN:     For cramping NSAIDs or Tylenol were recommended.  Patient counseled that abnormal uterine bleeding is common in the first several months after IUD placement.  Patient advised to call the office for heavy bleeding, significant pain, or a  foul-smelling vaginal discharge.  5. Patient counseled that her particular IUD is effective as contraception in seven days

## 2024-06-03 ENCOUNTER — TELEPHONE (OUTPATIENT)
Dept: OBSTETRICS AND GYNECOLOGY | Facility: CLINIC | Age: 29
End: 2024-06-03
Payer: COMMERCIAL

## 2024-06-24 ENCOUNTER — OFFICE VISIT (OUTPATIENT)
Dept: OBSTETRICS AND GYNECOLOGY | Facility: CLINIC | Age: 29
End: 2024-06-24
Payer: COMMERCIAL

## 2024-06-24 VITALS
HEART RATE: 61 BPM | WEIGHT: 189.81 LBS | BODY MASS INDEX: 35.87 KG/M2 | DIASTOLIC BLOOD PRESSURE: 76 MMHG | SYSTOLIC BLOOD PRESSURE: 107 MMHG

## 2024-06-24 DIAGNOSIS — Z11.3 ROUTINE SCREENING FOR STI (SEXUALLY TRANSMITTED INFECTION): ICD-10-CM

## 2024-06-24 DIAGNOSIS — Z30.431 INTRAUTERINE DEVICE SURVEILLANCE: Primary | ICD-10-CM

## 2024-06-24 PROCEDURE — 3074F SYST BP LT 130 MM HG: CPT | Mod: CPTII,S$GLB,, | Performed by: NURSE PRACTITIONER

## 2024-06-24 PROCEDURE — 3078F DIAST BP <80 MM HG: CPT | Mod: CPTII,S$GLB,, | Performed by: NURSE PRACTITIONER

## 2024-06-24 PROCEDURE — 3008F BODY MASS INDEX DOCD: CPT | Mod: CPTII,S$GLB,, | Performed by: NURSE PRACTITIONER

## 2024-06-24 PROCEDURE — 87591 N.GONORRHOEAE DNA AMP PROB: CPT | Performed by: NURSE PRACTITIONER

## 2024-06-24 PROCEDURE — 87491 CHLMYD TRACH DNA AMP PROBE: CPT | Performed by: NURSE PRACTITIONER

## 2024-06-24 PROCEDURE — 99212 OFFICE O/P EST SF 10 MIN: CPT | Mod: S$GLB,,, | Performed by: NURSE PRACTITIONER

## 2024-06-24 PROCEDURE — 1159F MED LIST DOCD IN RCRD: CPT | Mod: CPTII,S$GLB,, | Performed by: NURSE PRACTITIONER

## 2024-06-24 PROCEDURE — 99999 PR PBB SHADOW E&M-EST. PATIENT-LVL III: CPT | Mod: PBBFAC,,, | Performed by: NURSE PRACTITIONER

## 2024-06-24 NOTE — PROGRESS NOTES
Chief Complaint: IUD check      HPI:      Cyndi is a 29 y.o. No obstetric history on file. who presents today for Mirena IUD check which was placed 5/27/2024.  She has had spotting since IUD insertion - has become lighter. She does notice intermittent cramping - not persistent or bothersome.     Cyndi  is currently sexually active . She is currently using Mirena inserted in 5/2024 for contraception.     Previous Pap: NILM (6/22/2023)    Gardasil:Completed     Physical Exam:      PHYSICAL EXAM:  /76   Pulse 61   Wt 86.1 kg (189 lb 13.1 oz)   BMI 35.87 kg/m²   Body mass index is 35.87 kg/m².     APPEARANCE: Well nourished, well developed, in no acute distress.  PELVIC:  External genitalia and urethra within normal limits. Vagina without lesions, without discharge, without erythema, without ulcers.  Cervix without cervical motion tenderness, non-friable. IUD strings visualized at cervical os ~1-2 cm.    Assessment/Plan:     Intrauterine device surveillance    Routine screening for STI (sexually transmitted infection)  -     C. trachomatis/N. gonorrhoeae by AMP DNA [facility]; Cervicovaginal      PLAN:    Had Mirena IUD placed 5/27/2024. Tolerating method well.  IUD strings visualized at cervical os at appropriate length.  Patient counseled that abnormal uterine bleeding is common in the first several months after IUD placement.  Patient advised to call the office for heavy bleeding, significant pain, or a  foul-smelling vaginal discharge.   GC/CT collected.     Follow up in about 1 year (around 6/24/2025) for WWE/IUD check.

## 2024-06-25 ENCOUNTER — PATIENT MESSAGE (OUTPATIENT)
Dept: OBSTETRICS AND GYNECOLOGY | Facility: CLINIC | Age: 29
End: 2024-06-25
Payer: COMMERCIAL

## 2024-06-25 DIAGNOSIS — A74.9 CHLAMYDIA: Primary | ICD-10-CM

## 2024-06-25 LAB
C TRACH DNA SPEC QL NAA+PROBE: DETECTED
N GONORRHOEA DNA SPEC QL NAA+PROBE: NOT DETECTED

## 2024-06-25 RX ORDER — DOXYCYCLINE 100 MG/1
100 CAPSULE ORAL 2 TIMES DAILY
Qty: 14 CAPSULE | Refills: 0 | Status: SHIPPED | OUTPATIENT
Start: 2024-06-25 | End: 2024-07-02

## 2024-06-27 ENCOUNTER — TELEPHONE (OUTPATIENT)
Dept: PRIMARY CARE CLINIC | Facility: CLINIC | Age: 29
End: 2024-06-27

## 2024-06-27 ENCOUNTER — OFFICE VISIT (OUTPATIENT)
Dept: PRIMARY CARE CLINIC | Facility: CLINIC | Age: 29
End: 2024-06-27
Payer: COMMERCIAL

## 2024-06-27 VITALS
HEIGHT: 61 IN | OXYGEN SATURATION: 98 % | BODY MASS INDEX: 35.67 KG/M2 | SYSTOLIC BLOOD PRESSURE: 100 MMHG | DIASTOLIC BLOOD PRESSURE: 78 MMHG | WEIGHT: 188.94 LBS | HEART RATE: 60 BPM

## 2024-06-27 DIAGNOSIS — F41.1 GAD (GENERALIZED ANXIETY DISORDER): ICD-10-CM

## 2024-06-27 DIAGNOSIS — Z00.00 PREVENTATIVE HEALTH CARE: Primary | ICD-10-CM

## 2024-06-27 DIAGNOSIS — J30.1 SEASONAL ALLERGIC RHINITIS DUE TO POLLEN: ICD-10-CM

## 2024-06-27 DIAGNOSIS — Z91.89 SEDENTARY LIFESTYLE: ICD-10-CM

## 2024-06-27 DIAGNOSIS — E66.9 OBESITY (BMI 35.0-39.9 WITHOUT COMORBIDITY): ICD-10-CM

## 2024-06-27 PROBLEM — G47.00 INSOMNIA: Status: RESOLVED | Noted: 2023-05-18 | Resolved: 2024-06-27

## 2024-06-27 PROBLEM — N89.8 VAGINAL DISCHARGE: Status: RESOLVED | Noted: 2024-03-26 | Resolved: 2024-06-27

## 2024-06-27 PROBLEM — K21.9 GERD (GASTROESOPHAGEAL REFLUX DISEASE): Status: RESOLVED | Noted: 2023-05-18 | Resolved: 2024-06-27

## 2024-06-27 PROCEDURE — 99999 PR PBB SHADOW E&M-EST. PATIENT-LVL IV: CPT | Mod: PBBFAC,,, | Performed by: INTERNAL MEDICINE

## 2024-06-27 NOTE — PROGRESS NOTES
[facility]      Chief Complaint     Annual Exam       Subjective:      History of Present Illness:  Cyndi Chen is a 29 y.o. female     Recently had IUD check-up this past Monday and had GC.CT screen which was +Chlamydia despite having not symptoms. Had CT 1 year ago and both partner and she were tx'ed but did not have a test of cure. Taking Doxy 100mg bid x 7 days   His  most recent partner 1.5 wks ago (unprotected) but is no longer with that person.      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   FH: MGM pancreatic cancer; mom breast cancer dx'ed at 54 y/o; 60 y/o; older sister dx'ed with ADD and takes Vyvanse  20mg and Adderall;      Past Medical History:  Past Medical History:   Diagnosis Date    Allergy     Encounter for Papanicolaou smear of vagina as part of routine gynecological examination 2023    GERD (gastroesophageal reflux disease) 2023    Insomnia 2023    Vaginal discharge 2024       Past Surgical History:  Past Surgical History:   Procedure Laterality Date    MOUTH SURGERY Bilateral        Allergies:  Review of patient's allergies indicates:   Allergen Reactions    Azithromycin Rash and Anaphylaxis       Home Medications:  Current Outpatient Medications  "  Medication Sig Dispense Refill    busPIRone (BUSPAR) 10 MG tablet Take 1 tablet (10 mg total) by mouth 3 (three) times daily. 90 tablet 11    doxycycline (VIBRAMYCIN) 100 MG Cap Take 1 capsule (100 mg total) by mouth 2 (two) times daily. for 7 days 14 capsule 0    levocetirizine (XYZAL) 5 MG tablet Take 5 mg by mouth every evening.      sertraline (ZOLOFT) 50 MG tablet Take 1 tab po daily 90 tablet 3     Current Facility-Administered Medications   Medication Dose Route Frequency Provider Last Rate Last Admin    levonorgestreL (MIRENA) 21 mcg/24 hr (8 yrs) 52 mg IUD 1 Intra Uterine Device  1 Intra Uterine Device Intrauterine     1 Intra Uterine Device at 05/27/24 0900        Family History:  Family History   Problem Relation Name Age of Onset    Cancer Mother      No Known Problems Father         Social History:  Social History     Tobacco Use    Smoking status: Never    Smokeless tobacco: Never   Substance Use Topics    Alcohol use: Yes    Drug use: No       Review of Systems:  Pertinent positives and negatives listed in HPI. All other systems are reviewed and are negative.    Health Maintaince :   Health Maintenance Topics with due status: Not Due       Topic Last Completion Date    TETANUS VACCINE 10/04/2019    Pap Smear 06/20/2023    Influenza Vaccine Not Due           Eye: UTD  Dental:  needs     Immunizations:   Pap smear 6/2023 WNL     The ASCVD Risk score (Simba DK, et al., 2019) failed to calculate for the following reasons:    The 2019 ASCVD risk score is only valid for ages 40 to 79      Objective:   /78 (BP Location: Left arm, Patient Position: Sitting, BP Method: Medium (Manual))   Pulse 60   Ht 5' 1" (1.549 m)   Wt 85.7 kg (188 lb 15 oz)   LMP 05/27/2024   SpO2 98%   BMI 35.70 kg/m²      Body mass index is 35.7 kg/m².       Physical Examination:  General: Alert and awake in no apparent distress  HEENT: Normocephalic and atraumatic; Tms WNL  Eyes:  PERRL; EOMi with anicteric sclera and " clear conjunctivae  Mouth:  Oropharynx clear and without exudate; moist mucous membranes  Neck:   Cervical nodes not enlarged (No submental, submandibular, preauricular, posterior auricular or occipital adenopathy); supple; no bruits  Cardio:  Regular rate and rhythm with normal S1 and S2; no murmurs or rubs  Resp:  CTAB; respirations unlabored; no wheezes, crackles or rhonchi  Abdom: Soft, NTND with normoactive bowel sounds; negative HSM  Extrem: Warm and well-perfused with no clubbing, cyanosis or edema  Skin:  No rashes, lesions, or color changes  Pulses:  2+ and symmetric distally  Neuro:  AAOx3; cooperative and pleasant with no focal deficits    Laboratory:      Most Recent Data:  Lab Results   Component Value Date    WBC 8.50 05/19/2023    HGB 13.2 05/19/2023    HCT 40.8 05/19/2023     05/19/2023    CHOL 142 05/19/2023    TRIG 56 05/19/2023    HDL 47 05/19/2023    ALT 22 05/19/2023    AST 24 05/19/2023     05/19/2023    K 4.2 05/19/2023     05/19/2023    BUN 11 05/19/2023    CO2 26 05/19/2023    TSH 1.585 04/29/2024    HGBA1C 4.8 05/19/2023              CBC:   WBC   Date Value Ref Range Status   05/19/2023 8.50 3.90 - 12.70 K/uL Final     Hemoglobin   Date Value Ref Range Status   05/19/2023 13.2 12.0 - 16.0 g/dL Final     Hematocrit   Date Value Ref Range Status   05/19/2023 40.8 37.0 - 48.5 % Final     Platelets   Date Value Ref Range Status   05/19/2023 298 150 - 450 K/uL Final     MCV   Date Value Ref Range Status   05/19/2023 93 82 - 98 fL Final     RDW   Date Value Ref Range Status   05/19/2023 12.1 11.5 - 14.5 % Final     BMP:   Sodium   Date Value Ref Range Status   05/19/2023 139 136 - 145 mmol/L Final     Potassium   Date Value Ref Range Status   05/19/2023 4.2 3.5 - 5.1 mmol/L Final     Chloride   Date Value Ref Range Status   05/19/2023 107 95 - 110 mmol/L Final     CO2   Date Value Ref Range Status   05/19/2023 26 23 - 29 mmol/L Final     BUN   Date Value Ref Range Status  "  05/19/2023 11 6 - 20 mg/dL Final     Creatinine   Date Value Ref Range Status   05/19/2023 0.8 0.5 - 1.4 mg/dL Final     Glucose   Date Value Ref Range Status   05/19/2023 85 70 - 110 mg/dL Final     Calcium   Date Value Ref Range Status   05/19/2023 9.2 8.7 - 10.5 mg/dL Final     LFTs:   Total Protein   Date Value Ref Range Status   05/19/2023 7.1 6.0 - 8.4 g/dL Final     Albumin   Date Value Ref Range Status   05/19/2023 3.7 3.5 - 5.2 g/dL Final     Total Bilirubin   Date Value Ref Range Status   05/19/2023 0.9 0.1 - 1.0 mg/dL Final     Comment:     For infants and newborns, interpretation of results should be based  on gestational age, weight and in agreement with clinical  observations.    Premature Infant recommended reference ranges:  Up to 24 hours.............<8.0 mg/dL  Up to 48 hours............<12.0 mg/dL  3-5 days..................<15.0 mg/dL  6-29 days.................<15.0 mg/dL       AST   Date Value Ref Range Status   05/19/2023 24 10 - 40 U/L Final     Alkaline Phosphatase   Date Value Ref Range Status   05/19/2023 94 55 - 135 U/L Final     ALT   Date Value Ref Range Status   05/19/2023 22 10 - 44 U/L Final     Coags: No results found for: "INR", "PROTIME", "PTT"  FLP:      Lab Results   Component Value Date    CHOL 142 05/19/2023     Lab Results   Component Value Date    HDL 47 05/19/2023     Lab Results   Component Value Date    LDLCALC 83.8 05/19/2023     Lab Results   Component Value Date    TRIG 56 05/19/2023     Lab Results   Component Value Date    CHOLHDL 33.1 05/19/2023      DM:      Hemoglobin A1C   Date Value Ref Range Status   05/19/2023 4.8 4.0 - 5.6 % Final     Comment:     ADA Screening Guidelines:  5.7-6.4%  Consistent with prediabetes  >or=6.5%  Consistent with diabetes    High levels of fetal hemoglobin interfere with the HbA1C  assay. Heterozygous hemoglobin variants (HbS, HgC, etc)do  not significantly interfere with this assay.   However, presence of multiple variants may " "affect accuracy.       LDL Cholesterol   Date Value Ref Range Status   05/19/2023 83.8 63.0 - 159.0 mg/dL Final     Comment:     The National Cholesterol Education Program (NCEP) has set the  following guidelines (reference values) for LDL Cholesterol:  Optimal.......................<130 mg/dL  Borderline High...............130-159 mg/dL  High..........................160-189 mg/dL  Very High.....................>190 mg/dL       Creatinine   Date Value Ref Range Status   05/19/2023 0.8 0.5 - 1.4 mg/dL Final     Thyroid:   TSH   Date Value Ref Range Status   04/29/2024 1.585 0.400 - 4.000 uIU/mL Final     Free T4   Date Value Ref Range Status   04/29/2024 0.88 0.71 - 1.51 ng/dL Final     Anemia: No results found for: "IRON", "TIBC", "FERRITIN", "RTXNFGAD80", "FOLATE"  Cardiac: No results found for: "TROPONINI", "CKTOTAL", "CKMB", "BNP"  Urinalysis:   Color, UA   Date Value Ref Range Status   05/19/2023 Yellow Yellow, Straw, Sravanthi Final     Specific Gravity, UA   Date Value Ref Range Status   05/19/2023 1.025 1.005 - 1.030 Final     Nitrite, UA   Date Value Ref Range Status   05/19/2023 Negative Negative Final     Ketones, UA   Date Value Ref Range Status   05/19/2023 Trace (A) Negative Final     WBC, UA   Date Value Ref Range Status   05/19/2023 17 (H) 0 - 5 /hpf Final       Other Results:  EKG (my interpretation):     Radiology:  No image results found.          Assessment/Plan     Cyndi Chen is a 29 y.o. female with:  1. Preventative health care  Assessment & Plan:  Make appt with [facility]   Labs: in 3 mo with urine GC/CT   Get COVID booster    Orders:  -     C. trachomatis/N. gonorrhoeae by AMP DNA [facility]; Urine; Future; Expected date: 09/27/2024  -     Lipid Panel; Future; Expected date: 06/27/2024  -     Hemoglobin A1C; Future; Expected date: 06/27/2024  -     Urinalysis; Future; Expected date: 06/27/2024  -     Comprehensive Metabolic Panel; Future; Expected date: 06/27/2024  -     CBC Auto " Differential; Future; Expected date: 06/27/2024  -     HIV 1/2 Ag/Ab (4th Gen); Future; Expected date: 06/27/2024  -     Treponema Pallidium Antibodies IgG, IgM; Future; Expected date: 06/27/2024  -     HEPATITIS PANEL, ACUTE; Future; Expected date: 06/27/2024    2. Obesity (BMI 35.0-39.9 without comorbidity)  Assessment & Plan:  -Check Bps at home weekly and prn symptoms for goal BP <130/80.  Avoid Chi's table salt; use Mrs. Garcia or Nico Smith no salt   -Start healthy diet high in fiber (25-35 gm daily), low fat dairy, lean protein, low in saturated/trans fat (minimize cheese, creamy salad dressings); high in calcium/magnesium/potassium; 1.5 gm sodium diet; low in processed sugars and foods, ie  WHITE sugars, bread, pasta, rice, ice cream, cookies, cake, doughnuts  -Drink 6-8 glasses of water daily  -Moderate exercise 30 min 5 times per week or 75 min intense exercise biweekly   -Start 8-10 hour intermittent fasting diet   -Avoid eating 3 hours prior to bedtime  -Reduce alcohol to 1-2 glasses per day  -Avoid smoking, vaping, stimulant drugs/mediations ie illicit drugs, pseudo-ephedrine  -Use ADD/ADHD meds sparingly  -Minimize NSAIDs          3. AUNG (generalized anxiety disorder)  Overview:  AUNG 7:  15    Assessment & Plan:  Stable on Zoloft 50mg daily; takes Buspar 10mg in am  Consider CBT with [facility] or virtual CBT  Will refer to [facility]      4. Seasonal allergic rhinitis due to pollen  Assessment & Plan:  Cont prn flonase;   Cont Xyzal 5mg daily       5. Sedentary lifestyle  Assessment & Plan:  Need to start going to gym at [employer] prior to going to work which starts at 2:30pm - 10:30pm               I spent a total of 38 minutes on the day of the visit.This includes face to face time and non-face to face time preparing to see the patient (eg, review of tests), obtaining and/or reviewing separately obtained history, documenting clinical information in the electronic or other health record,  independently interpreting results and communicating results to the patient/family/caregiver, or care coordinator.   Code Status:     Yaneth Andrew MD

## 2024-06-27 NOTE — ASSESSMENT & PLAN NOTE
-Check Bps at home weekly and prn symptoms for goal BP <130/80.  Avoid Chi's table salt; use Mrs. Garcia or Nico Smith no salt   -Start healthy diet high in fiber (25-35 gm daily), low fat dairy, lean protein, low in saturated/trans fat (minimize cheese, creamy salad dressings); high in calcium/magnesium/potassium; 1.5 gm sodium diet; low in processed sugars and foods, ie  WHITE sugars, bread, pasta, rice, ice cream, cookies, cake, doughnuts  -Drink 6-8 glasses of water daily  -Moderate exercise 30 min 5 times per week or 75 min intense exercise biweekly   -Start 8-10 hour intermittent fasting diet   -Avoid eating 3 hours prior to bedtime  -Reduce alcohol to 1-2 glasses per day  -Avoid smoking, vaping, stimulant drugs/mediations ie illicit drugs, pseudo-ephedrine  -Use ADD/ADHD meds sparingly  -Minimize NSAIDs

## 2024-06-27 NOTE — ASSESSMENT & PLAN NOTE
Stable on Zoloft 50mg daily; takes Buspar 10mg in am  Consider CBT with [facility] or virtual CBT  Will refer to [facility]

## 2024-07-01 PROBLEM — Z00.00 PREVENTATIVE HEALTH CARE: Status: RESOLVED | Noted: 2023-05-18 | Resolved: 2024-07-01

## 2024-07-18 ENCOUNTER — E-VISIT (OUTPATIENT)
Dept: FAMILY MEDICINE | Facility: CLINIC | Age: 29
End: 2024-07-18
Payer: COMMERCIAL

## 2024-07-18 DIAGNOSIS — B35.9 TINEA: Primary | ICD-10-CM

## 2024-07-18 RX ORDER — KETOCONAZOLE 20 MG/G
CREAM TOPICAL DAILY
Qty: 30 G | Refills: 1 | Status: SHIPPED | OUTPATIENT
Start: 2024-07-18

## 2024-07-18 NOTE — PROGRESS NOTES
Patient ID: Cyndi Chen is a 29 y.o. female.    Chief Complaint: Rash (Entered automatically based on patient selection in [employer].)          274}  The patient initiated a request through [employer] on 7/18/2024 for evaluation and management with a chief complaint of Rash (Entered automatically based on patient selection in [employer].)     I evaluated the questionnaire submission on 07/18/2024 .    Total Time (in minutes): 6     Ohs Peq Evisit Rash    7/18/2024 12:55 PM CDT - Filed by Patient   Do you agree to participate in an E-Visit? Yes   If you have any of the following symptoms, please present to your local emergency room or call 911:  I acknowledge   Are you pregnant, could you be pregnant, or are you breast feeding? None of the above   What is the main issue you would like addressed today? i have a rash under my right breast. i believed it to be a heat rash but i think it has developed into yeast. i usually can resolve this with home remedies but have not been able to this time   How would you describe your skin problem? Rash   When did your symptoms first appear? 7/5/2024   Where is it located?  Chest;  Clothes covered areas   Does it itch? Yes   Does it hurt? Yes   Where is the pain located? Where the skin change is noted   The pain came on: Gradually   The pain has the character of: Burning   Frequency of the pain (How often does it appear)? Fluctuates at random   Please select the face that most closely captures your pain level: 2   Is there discharge or drainage? No   Is there bleeding? No   Describe the character Spots;  Flat;  Open   Describe the color Red   Has it changed over time? Shrunk in size   Frequency of skin problem Seasonally   Duration of the skin problem (how long does it stay when it is present) Weeks   I have had a new exposure to No new exposures   I have had a new exposure to No new exposures   What have you used to treat the skin problem? antifungal lotion. medicated powder. apple  cider vinegar baths   If you have used anything for treatment, has it helped the symptoms? Yes   Other generalized symptoms that you associate with the rash No other symptoms   Provide any additional information you feel is important. it is not unusal for me to get a rash like this. i usually can get it under control but have not been able to.   At least one photo is required for treatment to be provided. You can upload a maximum of three photos of the affected area.     Are you able to take your vital signs? No          Active Problem List with Overview Notes    Diagnosis Date Noted    AUNG (generalized anxiety disorder) 05/18/2023     AUNG 7:  15      Sedentary lifestyle 05/18/2023    Obesity (BMI 35.0-39.9 without comorbidity) 05/18/2023    Seasonal allergic rhinitis due to pollen 05/18/2023      Recent Labs Obtained:  Lab Results   Component Value Date    WBC 8.50 05/19/2023    HGB 13.2 05/19/2023    HCT 40.8 05/19/2023    MCV 93 05/19/2023     05/19/2023     05/19/2023    K 4.2 05/19/2023    GLU 85 05/19/2023    CREATININE 0.8 05/19/2023    EGFRNORACEVR >60.0 05/19/2023    HGBA1C 4.8 05/19/2023    TSH 1.585 04/29/2024      Review of patient's allergies indicates:   Allergen Reactions    Azithromycin Rash and Anaphylaxis       Encounter Diagnosis   Name Primary?    Tinea Yes        No orders of the defined types were placed in this encounter.     Medications Ordered This Encounter   Medications    ketoconazole (NIZORAL) 2 % cream     Sig: Apply topically once daily.     Dispense:  30 g     Refill:  1        E-Visit Time Tracking:    Day 1 Time (in minutes): 6    Total Time (in minutes): 6      274}           [de-identified] : 47 yo f presents for annual  [FreeTextEntry1] : annual

## 2024-07-31 DIAGNOSIS — F41.1 GAD (GENERALIZED ANXIETY DISORDER): ICD-10-CM

## 2024-07-31 RX ORDER — SERTRALINE HYDROCHLORIDE 50 MG/1
TABLET, FILM COATED ORAL
Qty: 90 TABLET | Refills: 3 | Status: SHIPPED | OUTPATIENT
Start: 2024-07-31

## 2024-07-31 NOTE — TELEPHONE ENCOUNTER
No care due was identified.  [facility] Embedded Care Due Messages. Reference number: [id number].   7/31/2024 12:31:26 PM CDT

## 2024-09-27 DIAGNOSIS — F41.1 GAD (GENERALIZED ANXIETY DISORDER): ICD-10-CM

## 2024-09-27 NOTE — TELEPHONE ENCOUNTER
No care due was identified.  [facility] Embedded Care Due Messages. Reference number: [id number].   9/27/2024 11:39:38 AM CDT

## 2024-09-28 RX ORDER — BUSPIRONE HYDROCHLORIDE 10 MG/1
10 TABLET ORAL 3 TIMES DAILY
Qty: 90 TABLET | Refills: 11 | Status: SHIPPED | OUTPATIENT
Start: 2024-09-28 | End: 2025-09-28

## 2024-09-28 NOTE — TELEPHONE ENCOUNTER
Refill Routing Note   Medication(s) are not appropriate for processing by [facility] for the following reason(s):        Outside of protocol    [facility] action(s):  Route             Appointments  past 12m or future 3m with PCP    Date Provider   Last Visit   6/27/2024 Yaneth Andrew MD   Next Visit   Visit date not found Yaneth Andrew MD   ED visits in past 90 days: 0        Note composed:5:29 AM 09/28/2024

## 2024-09-30 ENCOUNTER — PATIENT MESSAGE (OUTPATIENT)
Dept: PRIMARY CARE CLINIC | Facility: CLINIC | Age: 29
End: 2024-09-30
Payer: COMMERCIAL

## 2024-09-30 ENCOUNTER — LAB VISIT (OUTPATIENT)
Dept: LAB | Facility: HOSPITAL | Age: 29
End: 2024-09-30
Attending: INTERNAL MEDICINE
Payer: COMMERCIAL

## 2024-09-30 DIAGNOSIS — Z00.00 PREVENTATIVE HEALTH CARE: ICD-10-CM

## 2024-09-30 LAB
ALBUMIN SERPL BCP-MCNC: 3.6 G/DL (ref 3.5–5.2)
ALP SERPL-CCNC: 91 U/L (ref 55–135)
ALT SERPL W/O P-5'-P-CCNC: 13 U/L (ref 10–44)
ANION GAP SERPL CALC-SCNC: 4 MMOL/L (ref 8–16)
AST SERPL-CCNC: 18 U/L (ref 10–40)
BASOPHILS # BLD AUTO: 0.09 K/UL (ref 0–0.2)
BASOPHILS NFR BLD: 1.2 % (ref 0–1.9)
BILIRUB SERPL-MCNC: 0.3 MG/DL (ref 0.1–1)
BUN SERPL-MCNC: 13 MG/DL (ref 6–20)
CALCIUM SERPL-MCNC: 9.1 MG/DL (ref 8.7–10.5)
CHLORIDE SERPL-SCNC: 112 MMOL/L (ref 95–110)
CHOLEST SERPL-MCNC: 136 MG/DL (ref 120–199)
CHOLEST/HDLC SERPL: 2.9 {RATIO} (ref 2–5)
CO2 SERPL-SCNC: 27 MMOL/L (ref 23–29)
CREAT SERPL-MCNC: 0.8 MG/DL (ref 0.5–1.4)
DIFFERENTIAL METHOD BLD: ABNORMAL
EOSINOPHIL # BLD AUTO: 0.7 K/UL (ref 0–0.5)
EOSINOPHIL NFR BLD: 9.5 % (ref 0–8)
ERYTHROCYTE [DISTWIDTH] IN BLOOD BY AUTOMATED COUNT: 12.7 % (ref 11.5–14.5)
EST. GFR  (NO RACE VARIABLE): >60 ML/MIN/1.73 M^2
ESTIMATED AVG GLUCOSE: 88 MG/DL (ref 68–131)
GLUCOSE SERPL-MCNC: 97 MG/DL (ref 70–110)
HAV IGM SERPL QL IA: NORMAL
HBA1C MFR BLD: 4.7 % (ref 4–5.6)
HBV CORE IGM SERPL QL IA: NORMAL
HBV SURFACE AG SERPL QL IA: NORMAL
HCT VFR BLD AUTO: 42.1 % (ref 37–48.5)
HCV AB SERPL QL IA: NORMAL
HDLC SERPL-MCNC: 47 MG/DL (ref 40–75)
HDLC SERPL: 34.6 % (ref 20–50)
HGB BLD-MCNC: 13.6 G/DL (ref 12–16)
HIV 1+2 AB+HIV1 P24 AG SERPL QL IA: NORMAL
IMM GRANULOCYTES # BLD AUTO: 0.03 K/UL (ref 0–0.04)
IMM GRANULOCYTES NFR BLD AUTO: 0.4 % (ref 0–0.5)
LDLC SERPL CALC-MCNC: 80 MG/DL (ref 63–159)
LYMPHOCYTES # BLD AUTO: 2.5 K/UL (ref 1–4.8)
LYMPHOCYTES NFR BLD: 32.4 % (ref 18–48)
MCH RBC QN AUTO: 30.8 PG (ref 27–31)
MCHC RBC AUTO-ENTMCNC: 32.3 G/DL (ref 32–36)
MCV RBC AUTO: 96 FL (ref 82–98)
MONOCYTES # BLD AUTO: 0.5 K/UL (ref 0.3–1)
MONOCYTES NFR BLD: 6.7 % (ref 4–15)
NEUTROPHILS # BLD AUTO: 3.8 K/UL (ref 1.8–7.7)
NEUTROPHILS NFR BLD: 49.8 % (ref 38–73)
NONHDLC SERPL-MCNC: 89 MG/DL
NRBC BLD-RTO: 0 /100 WBC
PLATELET # BLD AUTO: 333 K/UL (ref 150–450)
PMV BLD AUTO: 9.8 FL (ref 9.2–12.9)
POTASSIUM SERPL-SCNC: 4 MMOL/L (ref 3.5–5.1)
PROT SERPL-MCNC: 7.2 G/DL (ref 6–8.4)
RBC # BLD AUTO: 4.41 M/UL (ref 4–5.4)
SODIUM SERPL-SCNC: 143 MMOL/L (ref 136–145)
TREPONEMA PALLIDUM IGG+IGM AB [PRESENCE] IN SERUM OR PLASMA BY IMMUNOASSAY: NONREACTIVE
TRIGL SERPL-MCNC: 45 MG/DL (ref 30–150)
WBC # BLD AUTO: 7.65 K/UL (ref 3.9–12.7)

## 2024-09-30 PROCEDURE — 86593 SYPHILIS TEST NON-TREP QUANT: CPT | Performed by: INTERNAL MEDICINE

## 2024-09-30 PROCEDURE — 83036 HEMOGLOBIN GLYCOSYLATED A1C: CPT | Performed by: INTERNAL MEDICINE

## 2024-09-30 PROCEDURE — 80074 ACUTE HEPATITIS PANEL: CPT | Performed by: INTERNAL MEDICINE

## 2024-09-30 PROCEDURE — 85025 COMPLETE CBC W/AUTO DIFF WBC: CPT | Performed by: INTERNAL MEDICINE

## 2024-09-30 PROCEDURE — 87389 HIV-1 AG W/HIV-1&-2 AB AG IA: CPT | Performed by: INTERNAL MEDICINE

## 2024-09-30 PROCEDURE — 80061 LIPID PANEL: CPT | Performed by: INTERNAL MEDICINE

## 2024-09-30 PROCEDURE — 80053 COMPREHEN METABOLIC PANEL: CPT | Performed by: INTERNAL MEDICINE

## 2024-11-11 DIAGNOSIS — F41.1 GAD (GENERALIZED ANXIETY DISORDER): ICD-10-CM

## 2024-11-11 RX ORDER — SERTRALINE HYDROCHLORIDE 50 MG/1
TABLET, FILM COATED ORAL
Qty: 90 TABLET | Refills: 2 | Status: SHIPPED | OUTPATIENT
Start: 2024-11-11

## 2024-11-11 NOTE — TELEPHONE ENCOUNTER
No care due was identified.  [facility] Embedded Care Due Messages. Reference number: [id number].   11/11/2024 12:54:59 PM CST

## 2024-11-11 NOTE — TELEPHONE ENCOUNTER
Refill Decision Note   Cyndi Gustavo  is requesting a refill authorization.  Brief Assessment and Rationale for Refill:  Approve     Medication Therapy Plan:         Comments:     Note composed:3:41 PM 11/11/2024

## 2025-02-12 ENCOUNTER — E-VISIT (OUTPATIENT)
Dept: URGENT CARE | Facility: CLINIC | Age: 30
End: 2025-02-12
Payer: COMMERCIAL

## 2025-02-12 DIAGNOSIS — M54.9 BACK PAIN, UNSPECIFIED BACK LOCATION, UNSPECIFIED BACK PAIN LATERALITY, UNSPECIFIED CHRONICITY: Primary | ICD-10-CM

## 2025-02-12 DIAGNOSIS — F41.1 GAD (GENERALIZED ANXIETY DISORDER): ICD-10-CM

## 2025-02-12 RX ORDER — TIZANIDINE 2 MG/1
2 TABLET ORAL EVERY 8 HOURS PRN
Qty: 60 TABLET | Refills: 1 | Status: SHIPPED | OUTPATIENT
Start: 2025-02-12 | End: 2025-03-14

## 2025-02-12 RX ORDER — MELOXICAM 15 MG/1
15 TABLET ORAL DAILY PRN
Qty: 30 TABLET | Refills: 0 | Status: SHIPPED | OUTPATIENT
Start: 2025-02-12 | End: 2025-03-14

## 2025-02-12 RX ORDER — METHYLPREDNISOLONE 4 MG/1
TABLET ORAL
Qty: 21 TABLET | Refills: 0 | Status: SHIPPED | OUTPATIENT
Start: 2025-02-12

## 2025-02-12 NOTE — PROGRESS NOTES
Patient ID: Cyndi Chen is a 30 y.o. female.    Chief Complaint: General Illness (Entered automatically based on patient selection in [employer].)          274}  The patient initiated a request through [employer] on 2/12/2025 for evaluation and management with a chief complaint of General Illness (Entered automatically based on patient selection in [employer].)     I evaluated the questionnaire submission on 02/12/2025 .    Total Time (in minutes): 14     Ohs Peq Evisit Supergroup-Muscle,Back,Joint    2/12/2025 11:08 AM CST - Filed by Patient   What do you need help with? Back Problem   Do you agree to participate in an E-Visit? Yes   If you have any of the following symptoms, please present to your local emergency room or call 911: I acknowledge   Do you have any of the following pregnancy-related conditions? None   What is the main issue you would like addressed today? i have had a sharp pain in my lower back, nearly between my hips, for about 3 weeks now. I can normally do a good stretch and im bettet but this pain is lingering and not improving.   Where are you having pain? Lower Back   Does the pain extend into your legs? No   How bad is the pain? The pain is severe   Did you have an injury that caused the pain? No, I cannot remember an injury   How long has the pain been present? More than 1 week but less then 4 weeks   Have you had back pain in the past? I have had back pain before, but this is markedly different   Please list any medications or treatments you have used for back pain and indicate if it was effective or not. stretching, lidocaine cream, aceitomenophen. No they only help temporarily, but it always comes back.   Do you have a fever? No, I do not have a fever   Do you have any of the following? Areas that are numb or have a strange sensation;  Weakness   What makes the pain worse? Any movement;  Bending over   What makes the pain better? Hot or cold compress   Have you ever been diagnosed with  cancer? No   Have you ever been diagnosed with degenerative disc disease (arthritis of the spine)? No   Have you ever been diagnosed with osteoporosis or any other bone weakness? No   Have you ever had surgery on your back or spine? No   What is your usual health status? I am active and can move normally   Provide any additional information you feel is important.    Please attach any relevant images or files    Are you able to take your vital signs? No          Active Problem List with Overview Notes    Diagnosis Date Noted    AUNG (generalized anxiety disorder) 05/18/2023     AUNG 7:  15      Sedentary lifestyle 05/18/2023    Obesity (BMI 35.0-39.9 without comorbidity) 05/18/2023    Seasonal allergic rhinitis due to pollen 05/18/2023      Recent Labs Obtained:  Lab Results   Component Value Date    WBC 7.65 09/30/2024    HGB 13.6 09/30/2024    HCT 42.1 09/30/2024    MCV 96 09/30/2024     09/30/2024     09/30/2024    K 4.0 09/30/2024    GLU 97 09/30/2024    CREATININE 0.8 09/30/2024    EGFRNORACEVR >60.0 09/30/2024    HGBA1C 4.7 09/30/2024    TSH 1.585 04/29/2024      Review of patient's allergies indicates:   Allergen Reactions    Azithromycin Rash and Anaphylaxis       Encounter Diagnosis   Name Primary?    Back pain, unspecified back location, unspecified back pain laterality, unspecified chronicity Yes        Orders Placed This Encounter   Procedures    X-Ray Lumbar Spine 5 View     Standing Status:   Future     Standing Expiration Date:   2/12/2026     Order Specific Question:   May the Radiologist modify the order per protocol to meet the clinical needs of the patient?     Answer:   Yes      Medications Ordered This Encounter   Medications    meloxicam (MOBIC) 15 MG tablet     Sig: Take 1 tablet (15 mg total) by mouth daily as needed for Pain.     Dispense:  30 tablet     Refill:  0    methylPREDNISolone (MEDROL DOSEPACK) 4 mg tablet     Sig: follow package directions     Dispense:  21 tablet      Refill:  0    tiZANidine (ZANAFLEX) 2 MG tablet     Sig: Take 1 tablet (2 mg total) by mouth every 8 (eight) hours as needed (muscle spasms).     Dispense:  60 tablet     Refill:  1        E-Visit Time Tracking:    Day 1 Time (in minutes): 14    Total Time (in minutes): 14      274}

## 2025-02-13 ENCOUNTER — HOSPITAL ENCOUNTER (OUTPATIENT)
Dept: RADIOLOGY | Facility: HOSPITAL | Age: 30
Discharge: HOME | End: 2025-02-13
Attending: STUDENT IN AN ORGANIZED HEALTH CARE EDUCATION/TRAINING PROGRAM
Payer: COMMERCIAL

## 2025-02-13 DIAGNOSIS — M54.9 BACK PAIN, UNSPECIFIED BACK LOCATION, UNSPECIFIED BACK PAIN LATERALITY, UNSPECIFIED CHRONICITY: ICD-10-CM

## 2025-02-13 PROCEDURE — 72110 X-RAY EXAM L-2 SPINE 4/>VWS: CPT | Mod: TC

## 2025-02-13 PROCEDURE — 72110 X-RAY EXAM L-2 SPINE 4/>VWS: CPT | Mod: 26,,, | Performed by: RADIOLOGY

## 2025-02-13 RX ORDER — BUSPIRONE HYDROCHLORIDE 10 MG/1
10 TABLET ORAL 3 TIMES DAILY
Qty: 90 TABLET | Refills: 11 | Status: SHIPPED | OUTPATIENT
Start: 2025-02-13 | End: 2026-02-13

## 2025-02-13 NOTE — TELEPHONE ENCOUNTER
No care due was identified.  [facility] Embedded Care Due Messages. Reference number: [id number].   2/12/2025 6:45:15 PM CST

## 2025-06-18 DIAGNOSIS — F41.1 GAD (GENERALIZED ANXIETY DISORDER): ICD-10-CM

## 2025-06-18 RX ORDER — BUSPIRONE HYDROCHLORIDE 10 MG/1
10 TABLET ORAL 3 TIMES DAILY
Qty: 90 TABLET | Refills: 11 | Status: SHIPPED | OUTPATIENT
Start: 2025-06-18 | End: 2026-06-18

## 2025-06-18 NOTE — TELEPHONE ENCOUNTER
No care due was identified.  [facility] Embedded Care Due Messages. Reference number: [id number].   6/18/2025 12:05:48 PM CDT

## 2025-07-14 ENCOUNTER — OFFICE VISIT (OUTPATIENT)
Dept: PRIMARY CARE CLINIC | Facility: CLINIC | Age: 30
End: 2025-07-14
Payer: COMMERCIAL

## 2025-07-14 VITALS
SYSTOLIC BLOOD PRESSURE: 118 MMHG | BODY MASS INDEX: 34.26 KG/M2 | HEART RATE: 74 BPM | DIASTOLIC BLOOD PRESSURE: 76 MMHG | OXYGEN SATURATION: 98 % | HEIGHT: 61 IN | WEIGHT: 181.44 LBS

## 2025-07-14 DIAGNOSIS — Z00.00 ANNUAL PHYSICAL EXAM: Primary | ICD-10-CM

## 2025-07-14 DIAGNOSIS — E66.09 CLASS 1 OBESITY DUE TO EXCESS CALORIES WITHOUT SERIOUS COMORBIDITY WITH BODY MASS INDEX (BMI) OF 34.0 TO 34.9 IN ADULT: ICD-10-CM

## 2025-07-14 DIAGNOSIS — Z13.6 ENCOUNTER FOR LIPID SCREENING FOR CARDIOVASCULAR DISEASE: ICD-10-CM

## 2025-07-14 DIAGNOSIS — Z13.220 ENCOUNTER FOR LIPID SCREENING FOR CARDIOVASCULAR DISEASE: ICD-10-CM

## 2025-07-14 DIAGNOSIS — F41.1 GAD (GENERALIZED ANXIETY DISORDER): ICD-10-CM

## 2025-07-14 DIAGNOSIS — Z11.3 SCREEN FOR STD (SEXUALLY TRANSMITTED DISEASE): ICD-10-CM

## 2025-07-14 DIAGNOSIS — E66.811 CLASS 1 OBESITY DUE TO EXCESS CALORIES WITHOUT SERIOUS COMORBIDITY WITH BODY MASS INDEX (BMI) OF 34.0 TO 34.9 IN ADULT: ICD-10-CM

## 2025-07-14 DIAGNOSIS — N32.81 OAB (OVERACTIVE BLADDER): ICD-10-CM

## 2025-07-14 DIAGNOSIS — R39.15 URINARY URGENCY: ICD-10-CM

## 2025-07-14 DIAGNOSIS — Z86.19 HISTORY OF CHLAMYDIA: ICD-10-CM

## 2025-07-14 LAB
BACTERIA #/AREA URNS AUTO: ABNORMAL /HPF
BILIRUB UR QL STRIP.AUTO: NEGATIVE
CLARITY UR: CLEAR
COLOR UR AUTO: YELLOW
GLUCOSE UR QL STRIP: NEGATIVE
HGB UR QL STRIP: ABNORMAL
KETONES UR QL STRIP: NEGATIVE
LEUKOCYTE ESTERASE UR QL STRIP: ABNORMAL
MICROSCOPIC COMMENT: ABNORMAL
NITRITE UR QL STRIP: NEGATIVE
PH UR STRIP: 7 [PH]
PROT UR QL STRIP: NEGATIVE
RBC #/AREA URNS AUTO: 2 /HPF (ref 0–4)
SP GR UR STRIP: 1.02
SQUAMOUS #/AREA URNS AUTO: 6 /HPF
UROBILINOGEN UR STRIP-ACNC: NEGATIVE EU/DL
WBC #/AREA URNS AUTO: 23 /HPF (ref 0–5)

## 2025-07-14 PROCEDURE — 3008F BODY MASS INDEX DOCD: CPT | Mod: CPTII,S$GLB,, | Performed by: FAMILY MEDICINE

## 2025-07-14 PROCEDURE — 87186 SC STD MICRODIL/AGAR DIL: CPT | Performed by: FAMILY MEDICINE

## 2025-07-14 PROCEDURE — 99999 PR PBB SHADOW E&M-EST. PATIENT-LVL V: CPT | Mod: PBBFAC,,, | Performed by: FAMILY MEDICINE

## 2025-07-14 PROCEDURE — 3074F SYST BP LT 130 MM HG: CPT | Mod: CPTII,S$GLB,, | Performed by: FAMILY MEDICINE

## 2025-07-14 PROCEDURE — 81001 URINALYSIS AUTO W/SCOPE: CPT | Performed by: FAMILY MEDICINE

## 2025-07-14 PROCEDURE — 3078F DIAST BP <80 MM HG: CPT | Mod: CPTII,S$GLB,, | Performed by: FAMILY MEDICINE

## 2025-07-14 PROCEDURE — 99395 PREV VISIT EST AGE 18-39: CPT | Mod: S$GLB,,, | Performed by: FAMILY MEDICINE

## 2025-07-14 PROCEDURE — 1160F RVW MEDS BY RX/DR IN RCRD: CPT | Mod: CPTII,S$GLB,, | Performed by: FAMILY MEDICINE

## 2025-07-14 PROCEDURE — 1159F MED LIST DOCD IN RCRD: CPT | Mod: CPTII,S$GLB,, | Performed by: FAMILY MEDICINE

## 2025-07-14 RX ORDER — SERTRALINE HYDROCHLORIDE 50 MG/1
50 TABLET, FILM COATED ORAL DAILY
Qty: 90 TABLET | Refills: 1 | Status: SHIPPED | OUTPATIENT
Start: 2025-07-14

## 2025-07-14 NOTE — PROGRESS NOTES
"Subjective:       Patient ID: Cyndi Chen is a 30 y.o. female.    Chief Complaint: Annual Exam (Pt here for annual exam )    HPI  29 y/o patient of Dr. Andrew with hx of H pylori 2019, hx of Chlamydia, IUD in place, AUNG, Allergic Rhinits, family hx of breast cancer in mother is here for annual exam.     She is feeling good in general, she denies f/n/v/heartburn, she does drink 1-2 12 ounce cokes daily so she burps often, she denies d/constipation/cp/sob, she endorses urinary frequency, urgency and some incontinence feels like its been getting worse over the past year, never had kids, has not seen Urology, she denies dysuria/hematuria, she denies vaginal discharge, she uses condoms regularly. She takes baths 1-2 times daily. She denies douching. She is active at work, no dedicated exercise. She is not eating regularly or healthy. She is trying to drink more water. Sleeping ok. Mood ok.     GYN/hx of Chlamydia/family hx of breast cancer in mother: following with Nurse Flori, pelvic due, Mirena in place  AUNG: Zoloft 50 mg daily, Buspar 10 mg daily   Eye exam utd, wears contacts  Dental exam due    Review of Systems    Objective:      /76 (BP Location: Right arm, Patient Position: Sitting)   Pulse 74   Ht 5' 1" (1.549 m)   Wt 82.3 kg (181 lb 7 oz)   LMP 07/07/2025 (Approximate)   SpO2 98%   BMI 34.28 kg/m²   Physical Exam  Vitals and nursing note reviewed.   Constitutional:       Appearance: She is well-developed.   HENT:      Head: Normocephalic and atraumatic.      Right Ear: Tympanic membrane normal.      Left Ear: Tympanic membrane normal.      Mouth/Throat:      Pharynx: No oropharyngeal exudate or posterior oropharyngeal erythema.   Neck:      Thyroid: No thyromegaly.   Cardiovascular:      Rate and Rhythm: Normal rate and regular rhythm.      Heart sounds: Normal heart sounds.   Pulmonary:      Effort: Pulmonary effort is normal. No respiratory distress.      Breath sounds: Normal breath sounds. "   Abdominal:      General: Bowel sounds are normal. There is no distension.      Palpations: Abdomen is soft. There is no mass.      Tenderness: There is no abdominal tenderness.   Musculoskeletal:      Cervical back: Normal range of motion and neck supple.      Right lower leg: No edema.      Left lower leg: No edema.   Lymphadenopathy:      Cervical: No cervical adenopathy.   Skin:     General: Skin is warm and dry.   Neurological:      Mental Status: She is alert.         Assessment:       1. Annual physical exam    2. OAB (overactive bladder)    3. Urinary urgency    4. History of chlamydia    5. Encounter for lipid screening for cardiovascular disease    6. Class 1 obesity due to excess calories without serious comorbidity with body mass index (BMI) of 34.0 to 34.9 in adult    7. Screen for STD (sexually transmitted disease)    8. AUNG (generalized anxiety disorder)        Plan:   Cyndi was seen today for annual exam.    Diagnoses and all orders for this visit:    Annual physical exam  -     C. trachomatis/N. gonorrhoeae by AMP DNA; Future  -     CBC Auto Differential; Future  -     HIV 1/2 Ag/Ab (4th Gen); Future  -     Hepatitis Panel, Acute; Future  -     Treponema Pallidium Antibodies IgG, IgM; Future  -     Comprehensive Metabolic Panel; Future  -     Hemoglobin A1C; Future  -     TSH; Future  -     Lipid Panel; Future    OAB (overactive bladder)  -     Ambulatory referral/consult to Urogynecology; Future  -     Ambulatory referral/consult to Urology; Future    Urinary urgency  -     Ambulatory referral/consult to Urogynecology; Future  -     Ambulatory referral/consult to Urology; Future  -     Urinalysis  -     Urine Culture High Risk    History of chlamydia  -     C. trachomatis/N. gonorrhoeae by AMP DNA; Future    Encounter for lipid screening for cardiovascular disease  -     Lipid Panel; Future    Class 1 obesity due to excess calories without serious comorbidity with body mass index (BMI) of 34.0 to  34.9 in adult  -     Hemoglobin A1C; Future    Screen for STD (sexually transmitted disease)  -     C. trachomatis/N. gonorrhoeae by AMP DNA; Future  -     HIV 1/2 Ag/Ab (4th Gen); Future  -     Hepatitis Panel, Acute; Future  -     Treponema Pallidium Antibodies IgG, IgM; Future    AUNG (generalized anxiety disorder)  -     sertraline (ZOLOFT) 50 MG tablet; Take 1 tablet (50 mg total) by mouth once daily. Take 1 tab po daily

## 2025-07-16 LAB — BACTERIA UR CULT: ABNORMAL

## 2025-07-21 ENCOUNTER — OFFICE VISIT (OUTPATIENT)
Dept: UROLOGY | Facility: CLINIC | Age: 30
End: 2025-07-21
Payer: COMMERCIAL

## 2025-07-21 VITALS
HEART RATE: 72 BPM | BODY MASS INDEX: 34.99 KG/M2 | DIASTOLIC BLOOD PRESSURE: 68 MMHG | WEIGHT: 185.19 LBS | SYSTOLIC BLOOD PRESSURE: 95 MMHG

## 2025-07-21 DIAGNOSIS — R39.15 URINARY URGENCY: Primary | ICD-10-CM

## 2025-07-21 DIAGNOSIS — N32.81 OAB (OVERACTIVE BLADDER): ICD-10-CM

## 2025-07-21 DIAGNOSIS — N39.41 URGE INCONTINENCE: ICD-10-CM

## 2025-07-21 PROCEDURE — 3078F DIAST BP <80 MM HG: CPT | Mod: CPTII,S$GLB,,

## 2025-07-21 PROCEDURE — 1159F MED LIST DOCD IN RCRD: CPT | Mod: CPTII,S$GLB,,

## 2025-07-21 PROCEDURE — 1160F RVW MEDS BY RX/DR IN RCRD: CPT | Mod: CPTII,S$GLB,,

## 2025-07-21 PROCEDURE — 3008F BODY MASS INDEX DOCD: CPT | Mod: CPTII,S$GLB,,

## 2025-07-21 PROCEDURE — 3074F SYST BP LT 130 MM HG: CPT | Mod: CPTII,S$GLB,,

## 2025-07-21 PROCEDURE — 99999 PR PBB SHADOW E&M-EST. PATIENT-LVL III: CPT | Mod: PBBFAC,,,

## 2025-07-21 PROCEDURE — 99204 OFFICE O/P NEW MOD 45 MIN: CPT | Mod: S$GLB,,,

## 2025-07-21 PROCEDURE — G2211 COMPLEX E/M VISIT ADD ON: HCPCS | Mod: S$GLB,,,

## 2025-07-21 NOTE — PROGRESS NOTES
[facility]      New Overactive Bladder (OAB) Note    7/21/2025    Referred by:  Katharina Merchant MD    History of Present Illness    CHIEF COMPLAINT:  Patient presents today for follow up of urinary symptoms and recent urinary tract infection.    HISTORY OF PRESENT ILLNESS:  She reports longstanding urinary symptoms characterized by significant urgency and frequency with no warning before needing to urinate, resulting in multiple urinary accidents and significant distress about her lack of bladder control. These bladder control issues are lifelong, with a history of anxiety about urination and bed wetting during middle school. Her symptoms have progressively worsened over the past year with increasing difficulty controlling bladder function. She was diagnosed with a culture proven UTI last week. She has not yet started the prescribed antibiotics, noting she just had the prescription filled.    MEDICAL HISTORY:  She has an IUD placed approximately one year ago.        A review of 10+ systems was conducted with pertinent positive and negative findings documented in HPI with all other systems reviewed and negative.    Past medical, family, surgical and social history reviewed as documented in chart with pertinent positive medical, family, surgical and social history detailed in HPI.    Previous OAB therapies:  Behavioral Therapies  : (none) -  n/a  Medications  none  Other Therapies  No Other Therapies    Previous JI therapies:  no previous therapy    Exam Findings:    Const: no acute distress, conversant and alert  Eyes: anicteric, extraocular muscles intact  ENMT: normocephalic, Nl oral membranes  Cardio: no cyanosis, nl cap refill  Pulm: no tachypnea; no resp distress  Abd: soft, no tenderness  Musc: no laceration, no tenderness  Neuro: alert; oriented x 3  Skin: warm, dry; no petichiae  Psych: no anxiety; normal speech       Assessment/Plan:    Assessment & Plan    IMPRESSION:  - Suspect  overactive bladder given long-standing history of urgency, frequency, and urge incontinence.  - Treating current UTI with Keflex to establish baseline symptoms and rule out infection as primary cause.  - Consider tiered approach to treatment:  - 1) Behavioral therapies (timed voiding, double voiding)  - 2) Medications  - 3) Third-line therapies (e.g., Botox injections, sacral neuromodulation) if needed.    OVERACTIVE BLADDER:  1. Discussed bladder irritants (caffeine, artificial sweeteners, alcohol) that can exacerbate urgency and frequency.  2. Patient to complete a voiding diary for 3 different days after finishing UTI treatment, measuring urine output and frequency of urination.  3. Plan to review voiding diary at follow-up to assess severity and pattern of symptoms.    URINARY TRACT INFECTION:  1. Treating current UTI with Keflex to establish baseline symptoms and rule out infection as primary cause.    FOLLOW-UP:  1. Follow up in 6 weeks with completed voiding diary.       I spent a total of 45 minutes on the day of the visit.This includes face to face time and non-face to face time preparing to see the patient (eg, review of tests), obtaining and/or reviewing separately obtained history, documenting clinical information in the electronic or other health record, independently interpreting results and communicating results to the patient/family/caregiver, or care coordinator.          Visit complexity today is associated with medical care services that are part of the ongoing care related to the single serious and/or complex condition of Overactive bladder (OAB). A longitudinal relationship exists or is being developed between the patient and this practitioner for the care of this condition.